# Patient Record
Sex: MALE | Race: WHITE | Employment: OTHER
[De-identification: names, ages, dates, MRNs, and addresses within clinical notes are randomized per-mention and may not be internally consistent; named-entity substitution may affect disease eponyms.]

---

## 2017-01-09 PROBLEM — M25.561 ACUTE PAIN OF RIGHT KNEE: Status: ACTIVE | Noted: 2017-01-09

## 2017-01-11 ENCOUNTER — PATIENT MESSAGE (OUTPATIENT)
Dept: FAMILY MEDICINE CLINIC | Facility: CLINIC | Age: 67
End: 2017-01-11

## 2017-01-12 NOTE — TELEPHONE ENCOUNTER
From: Meera Zhong  To: Monae Stephenson DO  Sent: 1/11/2017 10:58 AM EST  Subject: X Ray    Thanks. Please forgive my ignorance of your procedures, so I am asking this because I just don't know the answer. If you ordered the consult, then I don't need a copy of it? - I can just go ahead and book the appointment? Please advise. Thanks again  ----- Message -----  From: Monae Stephenson DO  Sent: 1/9/2017 8:56 PM EST  To: Meera Zhong  Subject: RE: Josué Wilson    Please note that I ordered the consult. Should wait until seeing ortho prior to trying to get the MRI, so as less likely to be denied . Thanks.     ----- Message -----   From: Meera Zhong   Sent: 1/8/2017 1:14 PM EST   To: Monae Stephenson DO  Subject: RE: Josué Wilson    Thank you! Yes, I'd like to follow through with this. I'll be out of town all week - could you have the front office mail the referral to me? If not, I'll stop and pick it up on the 16th, so if they could mail it, maybe I could make the appointment with Dr. Laney Santoyo for the 16th. Anyway, let me know please. .. Also, do you think an MRI would be of any value? Thanks again,    Melidaabimael Sheryl  [phone] cell  ----- Message -----  From: Monae Stephenson DO  Sent: 1/4/2017 6:37 PM EST  To: Meera Zhong  Subject: RE: Josué Wilson    We can refer to Dr Laney Santoyo of orthopedics [facility] us in this building. He is on the sport med team with us. Let me know. Thanks.     ----- Message -----   From: Meera Zhong   Sent: 1/4/2017 11:58 AM EST   To: Monae Stephenson DO  Subject: RE: [address] Dr,     I know you are very busy and so I do appreciate your reply. I continue the Celebrex, however I'd like to be more proactive. You mention PT - is there a PT or maybe a sports MD you'd want to refer me to? Would really like to determine the CAUSE. I really want to get active on this. [place] [facility] [place] later this year and I want to be able to run.      BTW the sensation (pain) seems to move around - maybe bc compensating somehow? Any additional help you could suggest would be welcome - thanks.  ----- Message -----  From: Justin Metcalf DO  Sent: 1/3/2017 10:31 PM EST  To: Tab Kaye  Subject: RE: Moni GONZALEZ still recommend to take the Celebrex for now if tolerated well. May need physical therapy. But take meds, and avoid activity that aggravates this. Thanks.     ----- Message -----   From: Tab Kaye   Sent: 12/30/2016 2:35 PM EST   To: Justin Metcalf DO  Subject: RE: Moni Wallace    Thanks for reply! Don't want to wait for appt to do something about the knee - your msg indicates that you saw no fracture, dislocation, or arthritis - correct? 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 Celebrex may help mask the symptom (too soon to be sure) but doesn't treat the cause - what else can I do to correct this? Thanks, happy new year!  ----- Message -----  From: Justin Metcalf DO  Sent: 12/29/2016 7:17 PM EST  To: Tab Kaye  Subject: RE: Moni GONZALEZ, please note that I wrote results notes on the results, and the x-ray of the knee is normal. Also not too concerned on the CRP. The thyroid is mildly low, and the cholesterol levels high. We can discuss this further at the next appt. Have a Happy New Year. Thanks.     ----- Message -----   From: Tab Kaye   Sent: 12/29/2016 10:37 AM EST   To: Justin Metcalf DO  Subject: Moni Moore,    hope you enjoyed your wife's birthday party last night. Thanks for taking some extra time for me - I appreciate it. Got the X ray done this morning, would appreciate any insights after you read it. I didn't stick around to get a disc copy, so if you want you could send it to me with your reply - thanks again.      I was interested that my C-reactive protein was up to 1.2 - I know that's not bad, but I've been used to seeing it under 0.5, in the past. My layperson understanding is that this reading can be generalized to symbolize all inflammation in the system, so I'm wondering if the increase is related to my knee and if, therefore, there really is more inflammation there than we suspected? Will get Celebrex today! Starla Calderon  ----- Message -----  From: Tay Pa DO  Sent: 11/21/2016 6:52 PM EST  To: Lele Granda  Subject: RE: Prescription Question    If you are early enough and we get the lab orders done, than most likely can have the labs done after appointment. But I need to see you to document the diagnoses etc to correlate why I am ordering the labs, and we review the records. Hopefully this would work out well. Thanks    ----- Message -----   From: Lele Granda   Sent: 11/21/2016 5:25 PM EST   To: Tay Pa DO  Subject: RE: Prescription Question    Thanks very much for getting back to me. So, to clarify, you DO. .. or DO NOT. .. want me to have labs drawn before I see you at 10 - ? Sorry, but your answer didn't make this clear to me. If you want them drawn before 10 - I assume I'll need an order from you before I walk in there. .. Please advise - thanks again.  ----- Message -----  From: Tay Pa DO  Sent: 11/7/2016 6:09 PM EST  To: Lele Granda  Subject: RE: Prescription Question    Hi, Yes we have records. Also if you have an appt before 11:00 AM, any day, or on Monday or Thursday, before 3:00 PM, should be able to have labs drawn here. Thanks.     ----- Message -----   From: Lele Granda   Sent: 11/5/2016 4:58 PM EDT   To: Tay Pa DO  Subject: RE: Prescription Question    Question - I've booked a \"wellness visit,\" per your recommendation, for Dec. 12, and have a couple of followup questions:    1. Has [facility] come through yet with my records? 2. I was told that you don't want blood work until AFTER I see you. If that is so, can I count on being able to get the blood work done that same day (after our appointment)? If yes, I will fast (our appointment is at [address] [place] [address]), if not I won't.     Please advise -

## 2017-03-13 ENCOUNTER — OFFICE VISIT (OUTPATIENT)
Dept: FAMILY MEDICINE CLINIC | Age: 67
End: 2017-03-13
Payer: MEDICARE

## 2017-03-13 VITALS
DIASTOLIC BLOOD PRESSURE: 71 MMHG | WEIGHT: 198 LBS | RESPIRATION RATE: 14 BRPM | SYSTOLIC BLOOD PRESSURE: 119 MMHG | TEMPERATURE: 96.7 F | BODY MASS INDEX: 24.62 KG/M2 | HEART RATE: 62 BPM | HEIGHT: 75 IN | OXYGEN SATURATION: 95 %

## 2017-03-13 DIAGNOSIS — H10.33 ACUTE CONJUNCTIVITIS OF BOTH EYES, UNSPECIFIED ACUTE CONJUNCTIVITIS TYPE: Primary | ICD-10-CM

## 2017-03-13 PROCEDURE — 99213 OFFICE O/P EST LOW 20 MIN: CPT | Performed by: FAMILY MEDICINE

## 2017-03-13 RX ORDER — TOBRAMYCIN 3 MG/ML
1 SOLUTION/ DROPS OPHTHALMIC EVERY 4 HOURS
Qty: 5 ML | Refills: 0 | Status: SHIPPED | OUTPATIENT
Start: 2017-03-13 | End: 2017-04-17 | Stop reason: ALTCHOICE

## 2017-03-13 RX ORDER — AZITHROMYCIN 250 MG/1
TABLET, FILM COATED ORAL
Qty: 6 TABLET | Refills: 0 | Status: SHIPPED | OUTPATIENT
Start: 2017-03-13 | End: 2017-04-17 | Stop reason: ALTCHOICE

## 2017-03-13 ASSESSMENT — ENCOUNTER SYMPTOMS
EYE PAIN: 1
NAUSEA: 0
EYE DISCHARGE: 1
SHORTNESS OF BREATH: 0
RHINORRHEA: 0
COUGH: 0
SORE THROAT: 0
BLOOD IN STOOL: 0
WHEEZING: 0
CONSTIPATION: 0
DIARRHEA: 0
VOMITING: 0
EYE REDNESS: 1
ABDOMINAL PAIN: 0
EYE ITCHING: 1

## 2017-04-17 ENCOUNTER — OFFICE VISIT (OUTPATIENT)
Dept: PULMONOLOGY | Age: 67
End: 2017-04-17
Payer: MEDICARE

## 2017-04-17 VITALS
TEMPERATURE: 98 F | BODY MASS INDEX: 25.03 KG/M2 | RESPIRATION RATE: 16 BRPM | WEIGHT: 195 LBS | HEIGHT: 74 IN | HEART RATE: 52 BPM | OXYGEN SATURATION: 99 % | SYSTOLIC BLOOD PRESSURE: 140 MMHG | DIASTOLIC BLOOD PRESSURE: 85 MMHG

## 2017-04-17 DIAGNOSIS — J45.30 MILD PERSISTENT ASTHMA WITHOUT COMPLICATION: Primary | ICD-10-CM

## 2017-04-17 PROCEDURE — 99213 OFFICE O/P EST LOW 20 MIN: CPT | Performed by: INTERNAL MEDICINE

## 2017-04-17 RX ORDER — CLOBETASOL PROPIONATE 0.46 MG/ML
SOLUTION TOPICAL
Refills: 0 | COMMUNITY
Start: 2017-03-21 | End: 2017-04-17 | Stop reason: ALTCHOICE

## 2017-04-17 RX ORDER — ALBUTEROL SULFATE 90 UG/1
2 AEROSOL, METERED RESPIRATORY (INHALATION) EVERY 6 HOURS PRN
Qty: 1 INHALER | Refills: 11 | Status: SHIPPED | OUTPATIENT
Start: 2017-04-17 | End: 2018-04-16 | Stop reason: SDUPTHER

## 2017-04-17 RX ORDER — MONTELUKAST SODIUM 10 MG/1
10 TABLET ORAL DAILY
Qty: 30 TABLET | Refills: 11 | Status: SHIPPED | OUTPATIENT
Start: 2017-04-17 | End: 2018-04-16 | Stop reason: SDUPTHER

## 2017-04-17 RX ORDER — KETOCONAZOLE 20 MG/ML
SHAMPOO TOPICAL
Refills: 0 | COMMUNITY
Start: 2017-03-21 | End: 2017-04-17 | Stop reason: ALTCHOICE

## 2017-04-17 ASSESSMENT — ENCOUNTER SYMPTOMS
WHEEZING: 1
EYES NEGATIVE: 1
SHORTNESS OF BREATH: 1

## 2017-05-08 ENCOUNTER — OFFICE VISIT (OUTPATIENT)
Dept: FAMILY MEDICINE CLINIC | Age: 67
End: 2017-05-08
Payer: MEDICARE

## 2017-05-08 VITALS
TEMPERATURE: 97 F | HEART RATE: 56 BPM | DIASTOLIC BLOOD PRESSURE: 57 MMHG | OXYGEN SATURATION: 95 % | WEIGHT: 195 LBS | HEIGHT: 74 IN | BODY MASS INDEX: 25.03 KG/M2 | SYSTOLIC BLOOD PRESSURE: 95 MMHG | RESPIRATION RATE: 14 BRPM

## 2017-05-08 DIAGNOSIS — E78.00 HYPERCHOLESTEREMIA: ICD-10-CM

## 2017-05-08 DIAGNOSIS — R19.5 POSITIVE FIT (FECAL IMMUNOCHEMICAL TEST): ICD-10-CM

## 2017-05-08 DIAGNOSIS — E03.9 HYPOTHYROIDISM (ACQUIRED): ICD-10-CM

## 2017-05-08 DIAGNOSIS — Z00.00 VISIT FOR WELL MAN HEALTH CHECK: Primary | ICD-10-CM

## 2017-05-08 DIAGNOSIS — Z00.00 ROUTINE GENERAL MEDICAL EXAMINATION AT A HEALTH CARE FACILITY: ICD-10-CM

## 2017-05-08 DIAGNOSIS — Z12.11 SCREENING FOR COLON CANCER: ICD-10-CM

## 2017-05-08 PROCEDURE — G0402 INITIAL PREVENTIVE EXAM: HCPCS | Performed by: FAMILY MEDICINE

## 2017-05-08 RX ORDER — LEVOTHYROXINE SODIUM 0.05 MG/1
50 TABLET ORAL DAILY
Qty: 30 TABLET | Refills: 1 | Status: SHIPPED | OUTPATIENT
Start: 2017-05-08 | End: 2017-07-03 | Stop reason: SDUPTHER

## 2017-05-08 ASSESSMENT — LIFESTYLE VARIABLES
HOW OFTEN DURING THE LAST YEAR HAVE YOU FAILED TO DO WHAT WAS NORMALLY EXPECTED FROM YOU BECAUSE OF DRINKING: 0
HOW MANY STANDARD DRINKS CONTAINING ALCOHOL DO YOU HAVE ON A TYPICAL DAY: 0
HOW OFTEN DURING THE LAST YEAR HAVE YOU HAD A FEELING OF GUILT OR REMORSE AFTER DRINKING: 0
HAS A RELATIVE, FRIEND, DOCTOR, OR ANOTHER HEALTH PROFESSIONAL EXPRESSED CONCERN ABOUT YOUR DRINKING OR SUGGESTED YOU CUT DOWN: 0
HOW OFTEN DO YOU HAVE A DRINK CONTAINING ALCOHOL: 2
HOW OFTEN DURING THE LAST YEAR HAVE YOU FOUND THAT YOU WERE NOT ABLE TO STOP DRINKING ONCE YOU HAD STARTED: 0
HOW OFTEN DO YOU HAVE SIX OR MORE DRINKS ON ONE OCCASION: 0
HOW OFTEN DURING THE LAST YEAR HAVE YOU NEEDED AN ALCOHOLIC DRINK FIRST THING IN THE MORNING TO GET YOURSELF GOING AFTER A NIGHT OF HEAVY DRINKING: 0
AUDIT TOTAL SCORE: 2
HOW OFTEN DO YOU HAVE SIX OR MORE DRINKS ON ONE OCCASION: 0
HOW OFTEN DURING THE LAST YEAR HAVE YOU BEEN UNABLE TO REMEMBER WHAT HAPPENED THE NIGHT BEFORE BECAUSE YOU HAD BEEN DRINKING: 0
HAVE YOU OR SOMEONE ELSE BEEN INJURED AS A RESULT OF YOUR DRINKING: 0
AUDIT-C TOTAL SCORE: 2
HOW OFTEN DO YOU HAVE A DRINK CONTAINING ALCOHOL: 2
HOW OFTEN DURING THE LAST YEAR HAVE YOU BEEN UNABLE TO REMEMBER WHAT HAPPENED THE NIGHT BEFORE BECAUSE YOU HAD BEEN DRINKING: 0
HOW OFTEN DURING THE LAST YEAR HAVE YOU FOUND THAT YOU WERE NOT ABLE TO STOP DRINKING ONCE YOU HAD STARTED: 0
AUDIT-C TOTAL SCORE: 2
HOW OFTEN DURING THE LAST YEAR HAVE YOU HAD A FEELING OF GUILT OR REMORSE AFTER DRINKING: 0
HOW MANY STANDARD DRINKS CONTAINING ALCOHOL DO YOU HAVE ON A TYPICAL DAY: 0

## 2017-05-08 ASSESSMENT — PATIENT HEALTH QUESTIONNAIRE - PHQ9: SUM OF ALL RESPONSES TO PHQ QUESTIONS 1-9: 0

## 2017-05-20 ENCOUNTER — PATIENT MESSAGE (OUTPATIENT)
Dept: PULMONOLOGY | Age: 67
End: 2017-05-20

## 2017-05-22 RX ORDER — AZITHROMYCIN 250 MG/1
TABLET, FILM COATED ORAL
Qty: 1 PACKET | Refills: 0 | OUTPATIENT
Start: 2017-05-22 | End: 2018-01-05 | Stop reason: SDUPTHER

## 2017-05-22 RX ORDER — PREDNISONE 10 MG/1
TABLET ORAL
Qty: 30 TABLET | Refills: 0 | OUTPATIENT
Start: 2017-05-22 | End: 2017-10-02 | Stop reason: ALTCHOICE

## 2017-05-24 ENCOUNTER — TELEPHONE (OUTPATIENT)
Dept: PULMONOLOGY | Age: 67
End: 2017-05-24

## 2017-06-12 ENCOUNTER — OFFICE VISIT (OUTPATIENT)
Dept: PULMONOLOGY | Age: 67
End: 2017-06-12
Payer: MEDICARE

## 2017-06-12 VITALS
RESPIRATION RATE: 18 BRPM | HEART RATE: 86 BPM | WEIGHT: 193 LBS | HEIGHT: 75 IN | DIASTOLIC BLOOD PRESSURE: 74 MMHG | OXYGEN SATURATION: 97 % | SYSTOLIC BLOOD PRESSURE: 150 MMHG | BODY MASS INDEX: 24 KG/M2

## 2017-06-12 DIAGNOSIS — J30.89 PERENNIAL ALLERGIC RHINITIS, UNSPECIFIED ALLERGIC RHINITIS TRIGGER: Primary | ICD-10-CM

## 2017-06-12 DIAGNOSIS — J45.30 MILD PERSISTENT ASTHMA WITHOUT COMPLICATION: ICD-10-CM

## 2017-06-12 PROCEDURE — 99213 OFFICE O/P EST LOW 20 MIN: CPT | Performed by: INTERNAL MEDICINE

## 2017-06-12 RX ORDER — FLUTICASONE PROPIONATE 50 MCG
2 SPRAY, SUSPENSION (ML) NASAL DAILY
Qty: 1 BOTTLE | Refills: 11 | Status: SHIPPED | OUTPATIENT
Start: 2017-06-12 | End: 2018-04-16 | Stop reason: SDUPTHER

## 2017-06-12 ASSESSMENT — ENCOUNTER SYMPTOMS
WHEEZING: 1
RHINORRHEA: 1
EYES NEGATIVE: 1
SINUS PRESSURE: 1
COUGH: 1
SHORTNESS OF BREATH: 1

## 2017-07-03 DIAGNOSIS — E03.9 HYPOTHYROIDISM (ACQUIRED): ICD-10-CM

## 2017-07-03 RX ORDER — LEVOTHYROXINE SODIUM 0.05 MG/1
50 TABLET ORAL DAILY
Qty: 30 TABLET | Refills: 1 | Status: SHIPPED | OUTPATIENT
Start: 2017-07-03 | End: 2017-08-28 | Stop reason: SDUPTHER

## 2017-07-17 ENCOUNTER — HOSPITAL ENCOUNTER (OUTPATIENT)
Age: 67
Setting detail: SPECIMEN
Discharge: HOME | End: 2017-07-17
Payer: MEDICARE

## 2017-07-17 DIAGNOSIS — E78.00 HYPERCHOLESTEREMIA: ICD-10-CM

## 2017-07-17 DIAGNOSIS — E03.9 HYPOTHYROIDISM (ACQUIRED): ICD-10-CM

## 2017-07-17 LAB
CHOLESTEROL/HDL RATIO: 4.8
CHOLESTEROL: 197 MG/DL
HDLC SERPL-MCNC: 41 MG/DL
LDL CHOLESTEROL: 141 MG/DL (ref 0–130)
TRIGL SERPL-MCNC: 73 MG/DL
TSH SERPL DL<=0.05 MIU/L-ACNC: 3.22 MIU/L (ref 0.3–5)
VLDLC SERPL CALC-MCNC: ABNORMAL MG/DL (ref 1–30)

## 2017-07-24 ENCOUNTER — OFFICE VISIT (OUTPATIENT)
Dept: FAMILY MEDICINE CLINIC | Age: 67
End: 2017-07-24
Payer: MEDICARE

## 2017-07-24 VITALS
TEMPERATURE: 97 F | DIASTOLIC BLOOD PRESSURE: 61 MMHG | HEIGHT: 75 IN | SYSTOLIC BLOOD PRESSURE: 99 MMHG | BODY MASS INDEX: 24.12 KG/M2 | RESPIRATION RATE: 16 BRPM | HEART RATE: 56 BPM | WEIGHT: 194 LBS

## 2017-07-24 DIAGNOSIS — E03.9 ACQUIRED HYPOTHYROIDISM: ICD-10-CM

## 2017-07-24 DIAGNOSIS — J45.20 MILD INTERMITTENT ASTHMA WITHOUT COMPLICATION: ICD-10-CM

## 2017-07-24 DIAGNOSIS — J30.89 PERENNIAL ALLERGIC RHINITIS, UNSPECIFIED ALLERGIC RHINITIS TRIGGER: ICD-10-CM

## 2017-07-24 DIAGNOSIS — E78.00 HYPERCHOLESTEREMIA: Primary | ICD-10-CM

## 2017-07-24 PROCEDURE — 99214 OFFICE O/P EST MOD 30 MIN: CPT | Performed by: FAMILY MEDICINE

## 2017-07-24 ASSESSMENT — ENCOUNTER SYMPTOMS
CONSTIPATION: 0
BLOOD IN STOOL: 0
NAUSEA: 0
COUGH: 1
VOMITING: 0
SHORTNESS OF BREATH: 0
ABDOMINAL PAIN: 0
SORE THROAT: 0
WHEEZING: 0
DIARRHEA: 0
RHINORRHEA: 1

## 2017-08-18 ENCOUNTER — TELEPHONE (OUTPATIENT)
Dept: FAMILY MEDICINE CLINIC | Age: 67
End: 2017-08-18

## 2017-08-18 DIAGNOSIS — J06.9 ACUTE URI: Primary | ICD-10-CM

## 2017-08-18 DIAGNOSIS — R05.9 COUGH: ICD-10-CM

## 2017-08-18 RX ORDER — BENZONATATE 100 MG/1
100 CAPSULE ORAL 3 TIMES DAILY PRN
Qty: 40 CAPSULE | Refills: 0 | Status: SHIPPED | OUTPATIENT
Start: 2017-08-18 | End: 2018-04-02 | Stop reason: ALTCHOICE

## 2017-08-18 RX ORDER — AZITHROMYCIN 250 MG/1
TABLET, FILM COATED ORAL
Qty: 6 TABLET | Refills: 0 | Status: SHIPPED | OUTPATIENT
Start: 2017-08-18 | End: 2017-10-02 | Stop reason: SDUPTHER

## 2017-10-02 ENCOUNTER — OFFICE VISIT (OUTPATIENT)
Dept: FAMILY MEDICINE CLINIC | Age: 67
End: 2017-10-02
Payer: MEDICARE

## 2017-10-02 VITALS
WEIGHT: 192.2 LBS | BODY MASS INDEX: 23.9 KG/M2 | SYSTOLIC BLOOD PRESSURE: 134 MMHG | OXYGEN SATURATION: 99 % | HEART RATE: 54 BPM | DIASTOLIC BLOOD PRESSURE: 79 MMHG | RESPIRATION RATE: 16 BRPM | HEIGHT: 75 IN

## 2017-10-02 DIAGNOSIS — H10.9 CONJUNCTIVITIS OF LEFT EYE, UNSPECIFIED CONJUNCTIVITIS TYPE: Primary | ICD-10-CM

## 2017-10-02 DIAGNOSIS — M62.830 MUSCLE SPASM OF BACK: ICD-10-CM

## 2017-10-02 PROCEDURE — 99213 OFFICE O/P EST LOW 20 MIN: CPT | Performed by: FAMILY MEDICINE

## 2017-10-02 RX ORDER — AZITHROMYCIN 250 MG/1
TABLET, FILM COATED ORAL
Qty: 6 TABLET | Refills: 0 | Status: SHIPPED | OUTPATIENT
Start: 2017-10-02 | End: 2018-01-05 | Stop reason: ALTCHOICE

## 2017-10-02 RX ORDER — BACLOFEN 10 MG/1
10 TABLET ORAL 3 TIMES DAILY PRN
Qty: 30 TABLET | Refills: 0 | Status: SHIPPED | OUTPATIENT
Start: 2017-10-02 | End: 2017-11-18 | Stop reason: SDUPTHER

## 2017-10-02 RX ORDER — TOBRAMYCIN 3 MG/ML
1 SOLUTION/ DROPS OPHTHALMIC EVERY 4 HOURS
Qty: 5 ML | Refills: 0 | Status: SHIPPED | OUTPATIENT
Start: 2017-10-02 | End: 2018-04-02 | Stop reason: ALTCHOICE

## 2017-10-02 ASSESSMENT — ENCOUNTER SYMPTOMS
DIARRHEA: 0
WHEEZING: 0
EYE REDNESS: 1
BACK PAIN: 1
NAUSEA: 0
COUGH: 0
ABDOMINAL PAIN: 0
RHINORRHEA: 0
VOMITING: 0
CONSTIPATION: 0
SHORTNESS OF BREATH: 0
SORE THROAT: 0
BLOOD IN STOOL: 0

## 2017-10-02 NOTE — PROGRESS NOTES
tablet 0    levothyroxine (SYNTHROID) 50 MCG tablet take 1 tablet by mouth once daily 30 tablet 5    fluticasone-salmeterol (ADVAIR DISKUS) 250-50 MCG/DOSE AEPB Inhale 1 puff into the lungs 2 times daily 60 each 11    montelukast (SINGULAIR) 10 MG tablet Take 1 tablet by mouth daily 30 tablet 11    albuterol sulfate HFA (PROAIR HFA) 108 (90 BASE) MCG/ACT inhaler Inhale 2 puffs into the lungs every 6 hours as needed for Wheezing 1 Inhaler 11    aspirin EC 81 MG EC tablet Take 81 mg by mouth every morning (before breakfast)      celecoxib (CELEBREX) 100 MG capsule Take 1-2 capsules by mouth daily as needed for Pain Take with food. 60 capsule 2    benzonatate (TESSALON PERLES) 100 MG capsule Take 1 capsule by mouth 3 times daily as needed for Cough 40 capsule 0    fluticasone (FLONASE) 50 MCG/ACT nasal spray 2 sprays by Nasal route daily 1 Bottle 11     No current facility-administered medications for this visit. Note review of PMH, PSH, PFH, social and immunizations. Past Medical History:   Diagnosis Date    Anxiety     remote, not ongoing    Asthma 1990    Deep vein thrombosis (DVT) of right lower extremity ([facility] [address] 1997    Depression     remote, not ongoing    Early stage skin cancer 2012    excised by dermatology then.  Factor V deficiency ([facility] [address]     Hx of factor V Leiden mutation 10/20/2016    Hypercholesteremia      Past Surgical History:   Procedure Laterality Date    VASECTOMY  1988     Family History   Problem Relation Age of Onset    Dementia Paternal Grandfather     Other Paternal Grandfather      ASDx     Social History     Social History    Marital status:      Spouse name: N/A    Number of children: N/A    Years of education: N/A     Occupational History    Not on file.      Social History Main Topics    Smoking status: Former Smoker     Packs/day: 2.00     Years: 23.00     Types: Cigarettes     Quit date: 3/30/1986    Smokeless tobacco: Never Used    Alcohol use oropharyngeal edema or posterior oropharyngeal erythema. Eyes: EOM are normal. Pupils are equal, round, and reactive to light. Right eye exhibits no discharge. Left eye exhibits discharge. Right conjunctiva is not injected. Left conjunctiva is injected. No scleral icterus. Left eye with conjunctival swelling. Note cornea and anterior chamber grossly clear. Neck: No JVD present. No thyromegaly present. Cardiovascular: Normal rate, regular rhythm, normal heart sounds and intact distal pulses. No murmur heard. Pulmonary/Chest: Effort normal and breath sounds normal. No respiratory distress. He has no wheezes. He has no rales. Abdominal: Soft. Bowel sounds are normal. He exhibits no distension and no mass. There is no tenderness. Musculoskeletal: He exhibits no edema (neg Homans'). Note increased restriction and muscle spasm of the left upper back area. Lymphadenopathy:     He has no cervical adenopathy. Neurological: He is alert and oriented to person, place, and time. He exhibits normal muscle tone. Skin: No rash noted. Vitals reviewed. Assessment:     1. Conjunctivitis of left eye, unspecified conjunctivitis type  tobramycin (TOBREX) 0.3 % ophthalmic solution    azithromycin (ZITHROMAX Z-INDU) 250 MG tablet   2. Muscle spasm of back  baclofen (LIORESAL) 10 MG tablet       Plan:     Case thoroughly discussed with patient and proposed management and DDg. Patient Instructions   Continue Diet low salt, high fiber, avoiding concentrated sweets. Continue fluids (water based) regularly especially in hot weather. Continue activity and exercise as tolerated. Continue present medications as ordered. Note the refill of the Tobrex eye drops, and the zithromax. Also the baclofen to help with muscle spasm. Note if upper back, neck etc not improving need to consider follow up appt with US/ OMT. Also if eye not improving, will need to have follow up with the eye doctor. lid.  ¨ Close your eye for 30 to 60 seconds to let the drops or ointment move around. ¨ Do not touch the ointment or dropper tip to your eyelashes or any other surface. · Do not share towels, pillows, or washcloths while you have pinkeye. When should you call for help? Call your doctor now or seek immediate medical care if:  · You have pain in your eye, not just irritation on the surface. · You have a change in vision or loss of vision. · You have an increase in discharge from the eye. · Your eye has not started to improve or begins to get worse within 48 hours after you start using antibiotics. · Pinkeye lasts longer than 7 days. Watch closely for changes in your health, and be sure to contact your doctor if you have any problems. Where can you learn more? Go to [email or web address] and sign in to your [employer] account. Enter Y392 in the [employer] box to learn more about \"Pinkeye: Care Instructions. \"     If you do not have an account, please click on the \"Sign Up Now\" link. Current as of: March 20, 2017  Content Version: 11.3  © 3653-6235 [employer]. Care instructions adapted under license by [facility] ([facility]). If you have questions about a medical condition or this instruction, always ask your healthcare professional. [place] [address] any warranty or liability for your use of this information. Back Spasm: Care Instructions  Your Care Instructions  A back spasm is sudden tightness and pain in your back muscles. It may happen from overuse or an injury. Things like sleeping in an awkward way, bending, lifting, standing, or sitting can sometimes cause a spasm. But the cause isn't always clear. Home treatment includes using heat or ice, taking over-the-counter (OTC) pain medicines, and avoiding activities that may cause back pain. For a back spasm that doesn't get better with home care, your doctor may prescribe medicine.  Treatments such as massage or manipulation may also help ease a back spasm. Your doctor may also suggest exercise or physical therapy to help improve strength and flexibility in your back muscles. In most cases, getting back to your normal activities is good for your back. Just make sure to avoid doing things that make your pain worse. Follow-up care is a key part of your treatment and safety. Be sure to make and go to all appointments, and call your doctor if you are having problems. It's also a good idea to know your test results and keep a list of the medicines you take. How can you care for yourself at home? Heat, ice, and medicines  · To relieve pain, use heat or ice (whichever feels better) on the affected area. ¨ Put a warm water bottle, a heating pad set on low, or a warm cloth on your back. Put a thin cloth between the heating pad and your skin. Do not go to sleep with a heating pad on your skin. ¨ Try ice or a cold pack on the area for 10 to 20 minutes at a time. Put a thin cloth between the ice and your skin. · Ask your doctor if you can take acetaminophen (such as Tylenol) or nonsteroidal anti-inflammatory drugs, such as ibuprofen or naproxen. Your doctor can prescribe stronger medicines if needed. Be safe with medicines. Read and follow all instructions on the label. Body positions and posture  · Sit or lie in positions that are most comfortable for you and that reduce pain. Try one of these positions when you lie down:  ¨ Lie on your back with your knees bent and supported by large pillows. ¨ Lie on the floor with your legs on the seat of a sofa or chair. Karolina Corby on your side with your knees and hips bent and a pillow between your legs. ¨ Lie on your stomach if it does not make pain worse. · Do not sit up in bed. Avoid soft couches and twisted positions. · Avoid bed rest after the first day of back pain. Bed rest can help relieve pain at first, but it delays healing.  Continued rest without activity is information. baclofen  Pronunciation:  AUTUMN de santiago  Brand:  Lioresal  What is the most important information I should know about baclofen? Do not use baclofen at a time when you need muscle tone for safe balance and movement during certain activities. Do not stop using baclofen suddenly, or you could have unpleasant withdrawal symptoms. What is baclofen? Baclofen is a muscle relaxer and an antispastic agent. Baclofen is used to treat muscle symptoms caused by multiple sclerosis, including spasm, pain, and stiffness. Baclofen is sometimes used to treat muscle spasms and other symptoms in people with injury or disease of the spinal cord. Baclofen may also be used for purposes not listed in this medication guide. What should I discuss with my healthcare provider before taking baclofen? You should not use baclofen if you are allergic to it. To make sure baclofen is safe for you, tell your doctor if you have:  · kidney disease;  · epilepsy or other seizure disorder;  · a history of stroke or blood clots; or  · if you also use a narcotic (opioid) medication. Using baclofen during pregnancy could harm the unborn baby. In animal studies, baclofen caused low birth weight and birth defects. However, it is not known whether these effects would occur in humans. Tell your doctor if you are pregnant or if you become pregnant while using this medicine. It is not known whether baclofen passes into breast milk or if it could harm a nursing baby. You should not breast-feed while you are using baclofen. Using baclofen may increase your risk of developing an ovarian cyst. Talk with your doctor about your specific risk. Baclofen is not approved for use by anyone younger than 15years old. How should I take baclofen? Follow all directions on your prescription label. Your doctor may occasionally change your dose to make sure you get the best results.  Do not use this medicine in larger or smaller amounts or for longer than recommended. Call your doctor if your muscle symptoms do not improve, or if they get worse. Do not stop using baclofen suddenly, or you could have unpleasant withdrawal symptoms such as hallucinations or a seizure. Ask your doctor how to safely stop using this medicine. Store at room temperature away from moisture and heat. What happens if I miss a dose? Take the missed dose as soon as you remember. Skip the missed dose if it is almost time for your next scheduled dose. Do not take extra medicine to make up the missed dose. What happens if I overdose? Seek emergency medical attention or call the Poison Help line at 1-460.861.7834. Overdose symptoms may include muscle weakness, vomiting, drowsiness, dilated or pinpoint pupils, weak or shallow breathing, seizure, or coma. What should I avoid while taking baclofen? Do not use baclofen at a time when you need muscle tone for safe balance and movement during certain activities. In some situations, it may be dangerous for you to have reduced muscle tone. Drinking alcohol with this medicine can cause side effects. This medication may impair your thinking or reactions. Be careful if you drive or do anything that requires you to be alert. What are the possible side effects of baclofen? Get emergency medical help if you have signs of an allergic reaction: hives; difficult breathing; swelling of your face, lips, tongue, or throat. Call your doctor at once if you have:  · weak or shallow breathing;  · confusion, hallucinations; or  · a seizure (convulsions). Common side effects may include:  · drowsiness, dizziness, weakness, tired feeling;  · headache;  · sleep problems (insomnia);  · nausea, constipation; or  · urinating more often than usual.  This is not a complete list of side effects and others may occur. Call your doctor for medical advice about side effects. You may report side effects to FDA at 7-087-YGE-1571.   What other drugs will affect

## 2017-10-02 NOTE — MR AVS SNAPSHOT
with US/ OMT. Also if eye not improving, will need to have follow up with the eye doctor. Pinkeye: Care Instructions  Your Care Instructions    Pinkeye is redness and swelling of the eye surface and the conjunctiva (the lining of the eyelid and the covering of the white part of the eye). Pinkeye is also called conjunctivitis. Pinkeye is often caused by infection with bacteria or a virus. Dry air, allergies, smoke, and chemicals are other common causes. Pinkeye often clears on its own in 7 to 10 days. Antibiotics only help if the pinkeye is caused by bacteria. Pinkeye caused by infection spreads easily. If an allergy or chemical is causing pinkeye, it will not go away unless you can avoid whatever is causing it. Follow-up care is a key part of your treatment and safety. Be sure to make and go to all appointments, and call your doctor if you are having problems. It's also a good idea to know your test results and keep a list of the medicines you take. How can you care for yourself at home? · Wash your hands often. Always wash them before and after you treat pinkeye or touch your eyes or face. · Use moist cotton or a clean, wet cloth to remove crust. Wipe from the inside corner of the eye to the outside. Use a clean part of the cloth for each wipe. · Put cold or warm wet cloths on your eye a few times a day if the eye hurts. · Do not wear contact lenses or eye makeup until the pinkeye is gone. Throw away any eye makeup you were using when you got pinkeye. Clean your contacts and storage case. If you wear disposable contacts, use a new pair when your eye has cleared and it is safe to wear contacts again. · If the doctor gave you antibiotic ointment or eyedrops, use them as directed. Use the medicine for as long as instructed, even if your eye starts looking better soon. Keep the bottle tip clean, and do not let it touch the eye area.   · To put in eyedrops or ointment: ¨ Tilt your head back, and pull your lower eyelid down with one finger. ¨ Drop or squirt the medicine inside the lower lid. ¨ Close your eye for 30 to 60 seconds to let the drops or ointment move around. ¨ Do not touch the ointment or dropper tip to your eyelashes or any other surface. · Do not share towels, pillows, or washcloths while you have pinkeye. When should you call for help? Call your doctor now or seek immediate medical care if:  · You have pain in your eye, not just irritation on the surface. · You have a change in vision or loss of vision. · You have an increase in discharge from the eye. · Your eye has not started to improve or begins to get worse within 48 hours after you start using antibiotics. · Pinkeye lasts longer than 7 days. Watch closely for changes in your health, and be sure to contact your doctor if you have any problems. Where can you learn more? Go to [email or web address] org and sign in to your TagArray account. Enter Y392 in the Rocketboom box to learn more about \"Pinkeye: Care Instructions. \"     If you do not have an account, please click on the \"Sign Up Now\" link. Current as of: March 20, 2017  Content Version: 11.3  © 0674-0954 [employer]. Care instructions adapted under license by [facility] ([facility]). If you have questions about a medical condition or this instruction, always ask your healthcare professional. [place] [address] any warranty or liability for your use of this information. Back Spasm: Care Instructions  Your Care Instructions  A back spasm is sudden tightness and pain in your back muscles. It may happen from overuse or an injury. Things like sleeping in an awkward way, bending, lifting, standing, or sitting can sometimes cause a spasm. But the cause isn't always clear.   Home treatment includes using heat or ice, taking over-the-counter (OTC) license by [facility] ([facility]). If you have questions about a medical condition or this instruction, always ask your healthcare professional. [address] any warranty or liability for your use of this information. baclofen  Pronunciation:  AUTUMN de santiago  Brand:  Lioresal  What is the most important information I should know about baclofen? Do not use baclofen at a time when you need muscle tone for safe balance and movement during certain activities. Do not stop using baclofen suddenly, or you could have unpleasant withdrawal symptoms. What is baclofen? Baclofen is a muscle relaxer and an antispastic agent. Baclofen is used to treat muscle symptoms caused by multiple sclerosis, including spasm, pain, and stiffness. Baclofen is sometimes used to treat muscle spasms and other symptoms in people with injury or disease of the spinal cord. Baclofen may also be used for purposes not listed in this medication guide. What should I discuss with my healthcare provider before taking baclofen? You should not use baclofen if you are allergic to it. To make sure baclofen is safe for you, tell your doctor if you have:  · kidney disease;  · epilepsy or other seizure disorder;  · a history of stroke or blood clots; or  · if you also use a narcotic (opioid) medication. Using baclofen during pregnancy could harm the unborn baby. In animal studies, baclofen caused low birth weight and birth defects. However, it is not known whether these effects would occur in humans. Tell your doctor if you are pregnant or if you become pregnant while using this medicine. It is not known whether baclofen passes into breast milk or if it could harm a nursing baby. You should not breast-feed while you are using baclofen. Using baclofen may increase your risk of developing an ovarian cyst. Talk with your doctor about your specific risk. Baclofen is not approved for use by anyone younger than 15years old. How should I take baclofen? Follow all directions on your prescription label. Your doctor may occasionally change your dose to make sure you get the best results. Do not use this medicine in larger or smaller amounts or for longer than recommended. Call your doctor if your muscle symptoms do not improve, or if they get worse. Do not stop using baclofen suddenly, or you could have unpleasant withdrawal symptoms such as hallucinations or a seizure. Ask your doctor how to safely stop using this medicine. Store at room temperature away from moisture and heat. What happens if I miss a dose? Take the missed dose as soon as you remember. Skip the missed dose if it is almost time for your next scheduled dose. Do not take extra medicine to make up the missed dose. What happens if I overdose? Seek emergency medical attention or call the Poison Help line at 1-909.327.4769. Overdose symptoms may include muscle weakness, vomiting, drowsiness, dilated or pinpoint pupils, weak or shallow breathing, seizure, or coma. What should I avoid while taking baclofen? Do not use baclofen at a time when you need muscle tone for safe balance and movement during certain activities. In some situations, it may be dangerous for you to have reduced muscle tone. Drinking alcohol with this medicine can cause side effects. This medication may impair your thinking or reactions. Be careful if you drive or do anything that requires you to be alert. What are the possible side effects of baclofen? Get emergency medical help if you have signs of an allergic reaction: hives; difficult breathing; swelling of your face, lips, tongue, or throat. Call your doctor at once if you have:  · weak or shallow breathing;  · confusion, hallucinations; or  · a seizure (convulsions).   Common side effects may include:  · drowsiness, dizziness, weakness, tired feeling;  · headache;  · sleep problems (insomnia);  · nausea, constipation; or healthcare practitioners. The absence of a warning for a given drug or drug combination in no way should be construed to indicate that the drug or drug combination is safe, effective or appropriate for any given patient. [facility] does not assume any responsibility for any aspect of healthcare administered with the aid of information [facility] provides. The information contained herein is not intended to cover all possible uses, directions, precautions, warnings, drug interactions, allergic reactions, or adverse effects. If you have questions about the drugs you are taking, check with your doctor, nurse or pharmacist.  Copyright 5642-5471 [address]. Version: 6.01. Revision date: 12/2/2016. Care instructions adapted under license by [facility] ([facility]). If you have questions about a medical condition or this instruction, always ask your healthcare professional. [place] [address] any warranty or liability for your use of this information. Today's Medication Changes          These changes are accurate as of: 10/2/17  6:14 PM.  If you have any questions, ask your nurse or doctor. START taking these medications           azithromycin 250 MG tablet   Commonly known as:  ZITHROMAX Z-INDU   Instructions: Take two tablets the first day, then one daily until gone. Quantity:  6 tablet   Refills:  0   Started by:  Mary Fisher DO       baclofen 10 MG tablet   Commonly known as:  LIORESAL   Instructions: Take 1 tablet by mouth 3 times daily as needed (muscle spasm) Sedation warning.    Quantity:  30 tablet   Refills:  0   Started by:  Mary Fisher DO       tobramycin 0.3 % ophthalmic solution   Commonly known as:  TOBREX   Instructions:  Place 1 drop into both eyes every 4 hours   Quantity:  5 mL   Refills:  0   Started by:  Mary Fisher, DO         STOP taking these medications           predniSONE 10 MG tablet   Commonly known as:  Sourav Theodore

## 2017-10-02 NOTE — PATIENT INSTRUCTIONS
Continue Diet low salt, high fiber, avoiding concentrated sweets. Continue fluids (water based) regularly especially in hot weather. Continue activity and exercise as tolerated. Continue present medications as ordered. Note the refill of the Tobrex eye drops, and the zithromax. Also the baclofen to help with muscle spasm. Note if upper back, neck etc not improving need to consider follow up appt with US/ OMT. Also if eye not improving, will need to have follow up with the eye doctor. Pinkeye: Care Instructions  Your Care Instructions    Pinkeye is redness and swelling of the eye surface and the conjunctiva (the lining of the eyelid and the covering of the white part of the eye). Pinkeye is also called conjunctivitis. Pinkeye is often caused by infection with bacteria or a virus. Dry air, allergies, smoke, and chemicals are other common causes. Pinkeye often clears on its own in 7 to 10 days. Antibiotics only help if the pinkeye is caused by bacteria. Pinkeye caused by infection spreads easily. If an allergy or chemical is causing pinkeye, it will not go away unless you can avoid whatever is causing it. Follow-up care is a key part of your treatment and safety. Be sure to make and go to all appointments, and call your doctor if you are having problems. It's also a good idea to know your test results and keep a list of the medicines you take. How can you care for yourself at home? · Wash your hands often. Always wash them before and after you treat pinkeye or touch your eyes or face. · Use moist cotton or a clean, wet cloth to remove crust. Wipe from the inside corner of the eye to the outside. Use a clean part of the cloth for each wipe. · Put cold or warm wet cloths on your eye a few times a day if the eye hurts. · Do not wear contact lenses or eye makeup until the pinkeye is gone. Throw away any eye makeup you were using when you got pinkeye. Clean your contacts and storage case.  If you wear disposable contacts, use a new pair when your eye has cleared and it is safe to wear contacts again. · If the doctor gave you antibiotic ointment or eyedrops, use them as directed. Use the medicine for as long as instructed, even if your eye starts looking better soon. Keep the bottle tip clean, and do not let it touch the eye area. · To put in eyedrops or ointment:  ¨ Tilt your head back, and pull your lower eyelid down with one finger. ¨ Drop or squirt the medicine inside the lower lid. ¨ Close your eye for 30 to 60 seconds to let the drops or ointment move around. ¨ Do not touch the ointment or dropper tip to your eyelashes or any other surface. · Do not share towels, pillows, or washcloths while you have pinkeye. When should you call for help? Call your doctor now or seek immediate medical care if:  · You have pain in your eye, not just irritation on the surface. · You have a change in vision or loss of vision. · You have an increase in discharge from the eye. · Your eye has not started to improve or begins to get worse within 48 hours after you start using antibiotics. · Pinkeye lasts longer than 7 days. Watch closely for changes in your health, and be sure to contact your doctor if you have any problems. Where can you learn more? Go to [email or web address]://[email or web address].[email or web address] and sign in to your NetShoes account. Enter Y392 in the mParticle box to learn more about \"Pinkeye: Care Instructions. \"     If you do not have an account, please click on the \"Sign Up Now\" link. Current as of: March 20, 2017  Content Version: 11.3  © 6182-3673 [employer]. Care instructions adapted under license by [facility] ([facility]). If you have questions about a medical condition or this instruction, always ask your healthcare professional. [address] any warranty or liability for your use of this information.        Back Spasm: Care Instructions  Your Care the Search Health Information box to learn more about \"Back Spasm: Care Instructions. \"     If you do not have an account, please click on the \"Sign Up Now\" link. Current as of: March 21, 2017  Content Version: 11.3  © 9812-3756 [employer]. Care instructions adapted under license by [facility] ([facility]). If you have questions about a medical condition or this instruction, always ask your healthcare professional. [address] any warranty or liability for your use of this information. baclofen  Pronunciation:  AUTUMN de santiago  Brand:  Lioresal  What is the most important information I should know about baclofen? Do not use baclofen at a time when you need muscle tone for safe balance and movement during certain activities. Do not stop using baclofen suddenly, or you could have unpleasant withdrawal symptoms. What is baclofen? Baclofen is a muscle relaxer and an antispastic agent. Baclofen is used to treat muscle symptoms caused by multiple sclerosis, including spasm, pain, and stiffness. Baclofen is sometimes used to treat muscle spasms and other symptoms in people with injury or disease of the spinal cord. Baclofen may also be used for purposes not listed in this medication guide. What should I discuss with my healthcare provider before taking baclofen? You should not use baclofen if you are allergic to it. To make sure baclofen is safe for you, tell your doctor if you have:  · kidney disease;  · epilepsy or other seizure disorder;  · a history of stroke or blood clots; or  · if you also use a narcotic (opioid) medication. Using baclofen during pregnancy could harm the unborn baby. In animal studies, baclofen caused low birth weight and birth defects. However, it is not known whether these effects would occur in humans. Tell your doctor if you are pregnant or if you become pregnant while using this medicine.   It is not known whether baclofen passes into breast milk or if it you have:  · weak or shallow breathing;  · confusion, hallucinations; or  · a seizure (convulsions). Common side effects may include:  · drowsiness, dizziness, weakness, tired feeling;  · headache;  · sleep problems (insomnia);  · nausea, constipation; or  · urinating more often than usual.  This is not a complete list of side effects and others may occur. Call your doctor for medical advice about side effects. You may report side effects to FDA at 3-283-YPW-2206. What other drugs will affect baclofen? Taking baclofen with other drugs that make you sleepy or slow your breathing can cause dangerous side effects or death. Ask your doctor before taking a sleeping pill, narcotic pain medicine, prescription cough medicine, a muscle relaxer, or medicine for anxiety, depression, or seizures. Other drugs may interact with baclofen, including prescription and over-the-counter medicines, vitamins, and herbal products. Tell each of your health care providers about all medicines you use now and any medicine you start or stop using. Where can I get more information? Your pharmacist can provide more information about baclofen. Remember, keep this and all other medicines out of the reach of children, never share your medicines with others, and use this medication only for the indication prescribed. Every effort has been made to ensure that the information provided by [address] is accurate, up-to-date, and complete, but no guarantee is made to that effect. Drug information contained herein may be time sensitive. [facility] information has been compiled for use by healthcare practitioners and consumers in the [place] and therefore [facility] does not warrant that uses outside of the [place] are appropriate, unless specifically indicated otherwise. [facility]'s drug information does not endorse drugs, diagnose patients or recommend therapy.  [employer]'s drug information is an informational resource designed

## 2018-01-02 DIAGNOSIS — B00.9 HSV-2 INFECTION: Primary | ICD-10-CM

## 2018-01-02 RX ORDER — VALACYCLOVIR HYDROCHLORIDE 500 MG/1
500 TABLET, FILM COATED ORAL 2 TIMES DAILY
Qty: 35 TABLET | Refills: 0 | Status: SHIPPED | OUTPATIENT
Start: 2018-01-02 | End: 2018-01-31 | Stop reason: SDUPTHER

## 2018-01-05 ENCOUNTER — TELEPHONE (OUTPATIENT)
Dept: FAMILY MEDICINE CLINIC | Age: 68
End: 2018-01-05

## 2018-01-05 DIAGNOSIS — J40 BRONCHITIS: Primary | ICD-10-CM

## 2018-01-05 RX ORDER — AZITHROMYCIN 250 MG/1
TABLET, FILM COATED ORAL
Qty: 1 PACKET | Refills: 0 | Status: SHIPPED | OUTPATIENT
Start: 2018-01-05 | End: 2018-01-15

## 2018-01-05 NOTE — TELEPHONE ENCOUNTER
Please call pt for more information on this, and we could probably sent this in. But also should have a follow up visit in 7-10 days for this and the other problems. Let me know. Thanks.

## 2018-01-05 NOTE — TELEPHONE ENCOUNTER
Saida Price stopped in to request a z-christi. He said he had sent you a email and had not heard from you. I did not see that in the chart. I did explain that you will probably require a appointment.

## 2018-01-05 NOTE — TELEPHONE ENCOUNTER
Fever LAST NIGHT COUGH SLIGHTLY PRODUCTIVE SAYS zPAK WORKS WELL FOR HIM PLEASE CALL IN TO [facility]  [place]

## 2018-01-30 ENCOUNTER — PATIENT MESSAGE (OUTPATIENT)
Dept: FAMILY MEDICINE CLINIC | Facility: CLINIC | Age: 68
End: 2018-01-30

## 2018-01-31 DIAGNOSIS — B00.9 HSV-2 INFECTION: ICD-10-CM

## 2018-01-31 RX ORDER — VALACYCLOVIR HYDROCHLORIDE 500 MG/1
500 TABLET, FILM COATED ORAL 2 TIMES DAILY
Qty: 35 TABLET | Refills: 1 | Status: SHIPPED | OUTPATIENT
Start: 2018-01-31 | End: 2018-06-12 | Stop reason: SDUPTHER

## 2018-01-31 NOTE — TELEPHONE ENCOUNTER
Ray    You can schedule the annual medicare well visit thru the . Also will need list of doctors on your insurance, or we can just try a different group. Thanks.     ----- Message -----   From: Eduardo Lowry Bao   Sent: 4/24/2017 3:58 PM EDT   To: Ngoc Osorio DO  Subject: RE: TED Rosales,    two things -- 1), you issued me a referral to Dr. Juana Walker to follow up on my stool sample - I went today to schedule an appointment and learned that they don't honor [facility] . Can you recommend some other practitioner who is with [facility]? 2) You stated that I am entitled to a [place] Visit\" (or something like that) each year, and you suggested we ought to schedule that in my birthday month, which is May. Should I schedule that through you or through the ? Let me know please. .. Subjects of discussion at that visit would be the GI stuff (I'd like to discuss with you more fully before I consult with the specialist, whoever that turns out to be) and the testosterone discussion. Thanks! Sofi Butler  [phone]  ----- Message -----  From: Ngoc Osorio DO  Sent: 4/3/2017 11:34 PM EDT  To: Eduardo Gore  Subject: RE: X Ray    Duplicate message. ----- Message -----   From: Rex Harrison   Sent: 3/31/2017 6:38 PM EDT   To: Ngoc Osorio DO  Subject: RE: Mily Rosales,    happy to say the derma visit went fine, condition seems under control. Will schedule GI consult soon. Eye condition responded to Zpack and drops also, thanks. Question: a friend sees Dr. Gita Bobby for something called \"depo-testosterone\" shots? You do these? Do you recommend? Pros and cons? My issues, in descending order, are:    1. overall energy  2. libido, as it related to energy and desire (erections not a problem)  3. muscle tone in general    Your input desired and welcome - thanks!     Sofi Butler  [phone] cell  ----- Message -----  From: Ngoc Osorio DO  Sent: 2/9/2017 6:32 PM EST  To:

## 2018-02-08 DIAGNOSIS — N52.9 ERECTILE DYSFUNCTION, UNSPECIFIED ERECTILE DYSFUNCTION TYPE: Primary | ICD-10-CM

## 2018-02-08 RX ORDER — SILDENAFIL 50 MG/1
50 TABLET, FILM COATED ORAL PRN
Qty: 10 TABLET | Refills: 0 | Status: SHIPPED | OUTPATIENT
Start: 2018-02-08 | End: 2020-07-24

## 2018-04-02 ENCOUNTER — OFFICE VISIT (OUTPATIENT)
Dept: FAMILY MEDICINE CLINIC | Age: 68
End: 2018-04-02
Payer: MEDICARE

## 2018-04-02 ENCOUNTER — HOSPITAL ENCOUNTER (OUTPATIENT)
Age: 68
Setting detail: SPECIMEN
Discharge: HOME | End: 2018-04-02
Payer: MEDICARE

## 2018-04-02 VITALS
TEMPERATURE: 97 F | SYSTOLIC BLOOD PRESSURE: 110 MMHG | HEIGHT: 75 IN | DIASTOLIC BLOOD PRESSURE: 68 MMHG | OXYGEN SATURATION: 99 % | BODY MASS INDEX: 22.75 KG/M2 | WEIGHT: 183 LBS | HEART RATE: 55 BPM | RESPIRATION RATE: 14 BRPM

## 2018-04-02 DIAGNOSIS — Z12.5 SCREENING FOR PROSTATE CANCER: ICD-10-CM

## 2018-04-02 DIAGNOSIS — E78.00 HYPERCHOLESTEREMIA: ICD-10-CM

## 2018-04-02 DIAGNOSIS — N52.9 ERECTILE DYSFUNCTION, UNSPECIFIED ERECTILE DYSFUNCTION TYPE: ICD-10-CM

## 2018-04-02 DIAGNOSIS — J45.20 MILD INTERMITTENT ASTHMA WITHOUT COMPLICATION: ICD-10-CM

## 2018-04-02 DIAGNOSIS — E78.00 HYPERCHOLESTEREMIA: Primary | ICD-10-CM

## 2018-04-02 DIAGNOSIS — E03.9 ACQUIRED HYPOTHYROIDISM: ICD-10-CM

## 2018-04-02 DIAGNOSIS — L57.0 SENILE KERATOSIS: ICD-10-CM

## 2018-04-02 DIAGNOSIS — Z23 NEED FOR PROPHYLACTIC VACCINATION AGAINST STREPTOCOCCUS PNEUMONIAE (PNEUMOCOCCUS): ICD-10-CM

## 2018-04-02 LAB
ABSOLUTE EOS #: 0.09 K/UL (ref 0–0.44)
ABSOLUTE IMMATURE GRANULOCYTE: <0.03 K/UL (ref 0–0.3)
ABSOLUTE LYMPH #: 1.2 K/UL (ref 1.1–3.7)
ABSOLUTE MONO #: 0.54 K/UL (ref 0.1–1.2)
ALBUMIN SERPL-MCNC: 4 G/DL (ref 3.5–5.2)
ALBUMIN/GLOBULIN RATIO: 1.4 (ref 1–2.5)
ALP BLD-CCNC: 30 U/L (ref 40–129)
ALT SERPL-CCNC: 15 U/L (ref 5–41)
ANION GAP SERPL CALCULATED.3IONS-SCNC: 14 MMOL/L (ref 9–17)
AST SERPL-CCNC: 17 U/L
BASOPHILS # BLD: 1 % (ref 0–2)
BASOPHILS ABSOLUTE: 0.04 K/UL (ref 0–0.2)
BILIRUB SERPL-MCNC: 0.28 MG/DL (ref 0.3–1.2)
BUN BLDV-MCNC: 17 MG/DL (ref 8–23)
BUN/CREAT BLD: ABNORMAL (ref 9–20)
CALCIUM SERPL-MCNC: 8.9 MG/DL (ref 8.6–10.4)
CHLORIDE BLD-SCNC: 105 MMOL/L (ref 98–107)
CHOLESTEROL/HDL RATIO: 3.4
CHOLESTEROL: 171 MG/DL
CO2: 24 MMOL/L (ref 20–31)
CREAT SERPL-MCNC: 1.11 MG/DL (ref 0.7–1.2)
DIFFERENTIAL TYPE: ABNORMAL
EOSINOPHILS RELATIVE PERCENT: 2 % (ref 1–4)
GFR AFRICAN AMERICAN: >60 ML/MIN
GFR NON-AFRICAN AMERICAN: >60 ML/MIN
GFR SERPL CREATININE-BSD FRML MDRD: ABNORMAL ML/MIN/{1.73_M2}
GFR SERPL CREATININE-BSD FRML MDRD: ABNORMAL ML/MIN/{1.73_M2}
GLUCOSE BLD-MCNC: 94 MG/DL (ref 70–99)
HCT VFR BLD CALC: 44.8 % (ref 40.7–50.3)
HDLC SERPL-MCNC: 50 MG/DL
HEMOGLOBIN: 14.5 G/DL (ref 13–17)
IMMATURE GRANULOCYTES: 0 %
LDL CHOLESTEROL: 110 MG/DL (ref 0–130)
LYMPHOCYTES # BLD: 23 % (ref 24–43)
MCH RBC QN AUTO: 31 PG (ref 25.2–33.5)
MCHC RBC AUTO-ENTMCNC: 32.4 G/DL (ref 28.4–34.8)
MCV RBC AUTO: 95.9 FL (ref 82.6–102.9)
MONOCYTES # BLD: 10 % (ref 3–12)
NRBC AUTOMATED: 0 PER 100 WBC
PDW BLD-RTO: 13.1 % (ref 11.8–14.4)
PLATELET # BLD: 184 K/UL (ref 138–453)
PLATELET ESTIMATE: ABNORMAL
PMV BLD AUTO: 9.9 FL (ref 8.1–13.5)
POTASSIUM SERPL-SCNC: 4.4 MMOL/L (ref 3.7–5.3)
PROSTATE SPECIFIC ANTIGEN: 2.43 UG/L
RBC # BLD: 4.67 M/UL (ref 4.21–5.77)
RBC # BLD: ABNORMAL 10*6/UL
SEG NEUTROPHILS: 64 % (ref 36–65)
SEGMENTED NEUTROPHILS ABSOLUTE COUNT: 3.3 K/UL (ref 1.5–8.1)
SEX HORMONE BINDING GLOBULIN: 76 NMOL/L (ref 11–80)
SODIUM BLD-SCNC: 143 MMOL/L (ref 135–144)
TESTOSTERONE FREE-NONMALE: 82.4 PG/ML (ref 47–244)
TESTOSTERONE TOTAL: 680 NG/DL (ref 220–1000)
TOTAL PROTEIN: 6.9 G/DL (ref 6.4–8.3)
TRIGL SERPL-MCNC: 56 MG/DL
TSH SERPL DL<=0.05 MIU/L-ACNC: 4.5 MIU/L (ref 0.3–5)
VLDLC SERPL CALC-MCNC: NORMAL MG/DL (ref 1–30)
WBC # BLD: 5.2 K/UL (ref 3.5–11.3)
WBC # BLD: ABNORMAL 10*3/UL

## 2018-04-02 PROCEDURE — 90670 PCV13 VACCINE IM: CPT | Performed by: FAMILY MEDICINE

## 2018-04-02 PROCEDURE — 99214 OFFICE O/P EST MOD 30 MIN: CPT | Performed by: FAMILY MEDICINE

## 2018-04-02 PROCEDURE — G0009 ADMIN PNEUMOCOCCAL VACCINE: HCPCS | Performed by: FAMILY MEDICINE

## 2018-04-02 RX ORDER — CLOBETASOL PROPIONATE 0.46 MG/ML
SOLUTION TOPICAL
Refills: 0 | COMMUNITY
Start: 2018-03-18 | End: 2018-04-02 | Stop reason: ALTCHOICE

## 2018-04-02 ASSESSMENT — ENCOUNTER SYMPTOMS
WHEEZING: 0
SORE THROAT: 0
BLOOD IN STOOL: 0
RHINORRHEA: 0
COUGH: 0
CONSTIPATION: 0
SHORTNESS OF BREATH: 0
ABDOMINAL PAIN: 0
VOMITING: 0
DIARRHEA: 0
NAUSEA: 0

## 2018-04-16 ENCOUNTER — OFFICE VISIT (OUTPATIENT)
Dept: PULMONOLOGY | Age: 68
End: 2018-04-16
Payer: MEDICARE

## 2018-04-16 VITALS
SYSTOLIC BLOOD PRESSURE: 142 MMHG | WEIGHT: 181.8 LBS | OXYGEN SATURATION: 98 % | TEMPERATURE: 97.4 F | HEART RATE: 60 BPM | BODY MASS INDEX: 22.6 KG/M2 | HEIGHT: 75 IN | DIASTOLIC BLOOD PRESSURE: 78 MMHG | RESPIRATION RATE: 16 BRPM

## 2018-04-16 DIAGNOSIS — J45.30 MILD PERSISTENT ASTHMA WITHOUT COMPLICATION: ICD-10-CM

## 2018-04-16 DIAGNOSIS — J30.2 CHRONIC SEASONAL ALLERGIC RHINITIS, UNSPECIFIED TRIGGER: Primary | ICD-10-CM

## 2018-04-16 PROCEDURE — 99213 OFFICE O/P EST LOW 20 MIN: CPT | Performed by: NURSE PRACTITIONER

## 2018-04-16 RX ORDER — FLUTICASONE PROPIONATE 50 MCG
2 SPRAY, SUSPENSION (ML) NASAL DAILY
Qty: 1 BOTTLE | Refills: 11 | Status: SHIPPED | OUTPATIENT
Start: 2018-04-16 | End: 2019-04-15 | Stop reason: SDUPTHER

## 2018-04-16 RX ORDER — MONTELUKAST SODIUM 10 MG/1
10 TABLET ORAL DAILY
Qty: 30 TABLET | Refills: 11 | Status: SHIPPED | OUTPATIENT
Start: 2018-04-16 | End: 2019-04-15 | Stop reason: SDUPTHER

## 2018-04-16 RX ORDER — ALBUTEROL SULFATE 90 UG/1
2 AEROSOL, METERED RESPIRATORY (INHALATION) EVERY 6 HOURS PRN
Qty: 1 INHALER | Refills: 11 | Status: SHIPPED | OUTPATIENT
Start: 2018-04-16 | End: 2020-06-11

## 2018-04-16 ASSESSMENT — ENCOUNTER SYMPTOMS: SHORTNESS OF BREATH: 1

## 2018-05-25 ENCOUNTER — OFFICE VISIT (OUTPATIENT)
Dept: FAMILY MEDICINE CLINIC | Age: 68
End: 2018-05-25
Payer: MEDICARE

## 2018-05-25 VITALS
HEART RATE: 60 BPM | RESPIRATION RATE: 16 BRPM | DIASTOLIC BLOOD PRESSURE: 63 MMHG | SYSTOLIC BLOOD PRESSURE: 113 MMHG | BODY MASS INDEX: 22.13 KG/M2 | HEIGHT: 75 IN | WEIGHT: 178 LBS | OXYGEN SATURATION: 97 % | TEMPERATURE: 98.9 F

## 2018-05-25 DIAGNOSIS — R19.5 POSITIVE FIT (FECAL IMMUNOCHEMICAL TEST): ICD-10-CM

## 2018-05-25 DIAGNOSIS — N52.9 ERECTILE DYSFUNCTION, UNSPECIFIED ERECTILE DYSFUNCTION TYPE: ICD-10-CM

## 2018-05-25 DIAGNOSIS — R13.10 DYSPHAGIA, UNSPECIFIED TYPE: Primary | ICD-10-CM

## 2018-05-25 DIAGNOSIS — Z12.11 SCREENING FOR COLON CANCER: ICD-10-CM

## 2018-05-25 DIAGNOSIS — N40.0 BENIGN PROSTATIC HYPERPLASIA, UNSPECIFIED WHETHER LOWER URINARY TRACT SYMPTOMS PRESENT: ICD-10-CM

## 2018-05-25 PROCEDURE — 99214 OFFICE O/P EST MOD 30 MIN: CPT | Performed by: FAMILY MEDICINE

## 2018-05-25 ASSESSMENT — PATIENT HEALTH QUESTIONNAIRE - PHQ9
SUM OF ALL RESPONSES TO PHQ9 QUESTIONS 1 & 2: 0
1. LITTLE INTEREST OR PLEASURE IN DOING THINGS: 0
SUM OF ALL RESPONSES TO PHQ QUESTIONS 1-9: 0
2. FEELING DOWN, DEPRESSED OR HOPELESS: 0

## 2018-05-25 ASSESSMENT — ENCOUNTER SYMPTOMS
RHINORRHEA: 0
SORE THROAT: 0
COUGH: 0
DIARRHEA: 0
CONSTIPATION: 0
WHEEZING: 0
NAUSEA: 0
SHORTNESS OF BREATH: 0
VOMITING: 0
BLOOD IN STOOL: 0

## 2018-05-28 PROBLEM — N40.0 BENIGN PROSTATIC HYPERPLASIA: Status: ACTIVE | Noted: 2018-05-28

## 2018-06-05 ENCOUNTER — TELEPHONE (OUTPATIENT)
Dept: FAMILY MEDICINE CLINIC | Age: 68
End: 2018-06-05

## 2018-06-05 DIAGNOSIS — R19.5 POSITIVE FIT (FECAL IMMUNOCHEMICAL TEST): ICD-10-CM

## 2018-06-05 DIAGNOSIS — E03.9 HYPOTHYROIDISM (ACQUIRED): ICD-10-CM

## 2018-06-05 DIAGNOSIS — Z12.11 SCREENING FOR COLON CANCER: ICD-10-CM

## 2018-06-05 DIAGNOSIS — R13.10 DYSPHAGIA, UNSPECIFIED TYPE: Primary | ICD-10-CM

## 2018-06-05 RX ORDER — LEVOTHYROXINE SODIUM 0.05 MG/1
TABLET ORAL
Qty: 30 TABLET | Refills: 2 | Status: SHIPPED | OUTPATIENT
Start: 2018-06-05 | End: 2018-07-26 | Stop reason: SDUPTHER

## 2018-06-12 DIAGNOSIS — B00.9 HSV-2 INFECTION: ICD-10-CM

## 2018-06-12 RX ORDER — VALACYCLOVIR HYDROCHLORIDE 500 MG/1
TABLET, FILM COATED ORAL
Qty: 35 TABLET | Refills: 3 | Status: ON HOLD | OUTPATIENT
Start: 2018-06-12 | End: 2018-07-24 | Stop reason: ALTCHOICE

## 2018-07-24 ENCOUNTER — ANESTHESIA (OUTPATIENT)
Dept: ENDOSCOPY | Age: 68
End: 2018-07-24
Payer: MEDICARE

## 2018-07-24 ENCOUNTER — ANESTHESIA EVENT (OUTPATIENT)
Dept: ENDOSCOPY | Age: 68
End: 2018-07-24
Payer: MEDICARE

## 2018-07-24 ENCOUNTER — HOSPITAL ENCOUNTER (OUTPATIENT)
Age: 68
Setting detail: OUTPATIENT SURGERY
Discharge: HOME | End: 2018-07-24
Attending: INTERNAL MEDICINE | Admitting: INTERNAL MEDICINE
Payer: MEDICARE

## 2018-07-24 VITALS
RESPIRATION RATE: 14 BRPM | HEART RATE: 57 BPM | DIASTOLIC BLOOD PRESSURE: 67 MMHG | OXYGEN SATURATION: 100 % | TEMPERATURE: 97.6 F | SYSTOLIC BLOOD PRESSURE: 101 MMHG

## 2018-07-24 VITALS
OXYGEN SATURATION: 98 % | SYSTOLIC BLOOD PRESSURE: 84 MMHG | RESPIRATION RATE: 13 BRPM | DIASTOLIC BLOOD PRESSURE: 55 MMHG

## 2018-07-24 PROCEDURE — 2500000003 HC RX 250 WO HCPCS: Performed by: NURSE ANESTHETIST, CERTIFIED REGISTERED

## 2018-07-24 PROCEDURE — 6360000002 HC RX W HCPCS: Performed by: NURSE ANESTHETIST, CERTIFIED REGISTERED

## 2018-07-24 PROCEDURE — 3700000001 HC ADD 15 MINUTES (ANESTHESIA): Performed by: INTERNAL MEDICINE

## 2018-07-24 PROCEDURE — 88305 TISSUE EXAM BY PATHOLOGIST: CPT

## 2018-07-24 PROCEDURE — 3609019800 HC COLONOSCOPY WITH SUBMUCOSAL INJECTION: Performed by: INTERNAL MEDICINE

## 2018-07-24 PROCEDURE — 3609012800 HC EGD DIAGNOSTIC ONLY: Performed by: INTERNAL MEDICINE

## 2018-07-24 PROCEDURE — 7100000010 HC PHASE II RECOVERY - FIRST 15 MIN: Performed by: INTERNAL MEDICINE

## 2018-07-24 PROCEDURE — 3609010400 HC COLONOSCOPY POLYPECTOMY HOT BIOPSY: Performed by: INTERNAL MEDICINE

## 2018-07-24 PROCEDURE — C1773 RET DEV, INSERTABLE: HCPCS | Performed by: INTERNAL MEDICINE

## 2018-07-24 PROCEDURE — 2709999900 HC NON-CHARGEABLE SUPPLY: Performed by: INTERNAL MEDICINE

## 2018-07-24 PROCEDURE — 7100000011 HC PHASE II RECOVERY - ADDTL 15 MIN: Performed by: INTERNAL MEDICINE

## 2018-07-24 PROCEDURE — 3700000000 HC ANESTHESIA ATTENDED CARE: Performed by: INTERNAL MEDICINE

## 2018-07-24 PROCEDURE — 2580000003 HC RX 258: Performed by: INTERNAL MEDICINE

## 2018-07-24 RX ORDER — GLYCOPYRROLATE 1 MG/5 ML
SYRINGE (ML) INTRAVENOUS PRN
Status: DISCONTINUED | OUTPATIENT
Start: 2018-07-24 | End: 2018-07-24 | Stop reason: SDUPTHER

## 2018-07-24 RX ORDER — PROPOFOL 10 MG/ML
INJECTION, EMULSION INTRAVENOUS PRN
Status: DISCONTINUED | OUTPATIENT
Start: 2018-07-24 | End: 2018-07-24 | Stop reason: SDUPTHER

## 2018-07-24 RX ORDER — SODIUM CHLORIDE 9 MG/ML
INJECTION, SOLUTION INTRAVENOUS CONTINUOUS
Status: DISCONTINUED | OUTPATIENT
Start: 2018-07-24 | End: 2018-07-24 | Stop reason: HOSPADM

## 2018-07-24 RX ORDER — LIDOCAINE HYDROCHLORIDE 10 MG/ML
INJECTION, SOLUTION EPIDURAL; INFILTRATION; INTRACAUDAL; PERINEURAL PRN
Status: DISCONTINUED | OUTPATIENT
Start: 2018-07-24 | End: 2018-07-24 | Stop reason: SDUPTHER

## 2018-07-24 RX ADMIN — Medication 0.2 MG: at 07:56

## 2018-07-24 RX ADMIN — SODIUM CHLORIDE: 9 INJECTION, SOLUTION INTRAVENOUS at 06:53

## 2018-07-24 RX ADMIN — LIDOCAINE HYDROCHLORIDE 70 MG: 10 INJECTION, SOLUTION EPIDURAL; INFILTRATION; INTRACAUDAL at 07:43

## 2018-07-24 RX ADMIN — Medication 0.2 MG: at 08:05

## 2018-07-24 RX ADMIN — PROPOFOL 450 MG: 10 INJECTION, EMULSION INTRAVENOUS at 07:45

## 2018-07-24 ASSESSMENT — PAIN SCALES - GENERAL
PAINLEVEL_OUTOF10: 0

## 2018-07-24 ASSESSMENT — PAIN - FUNCTIONAL ASSESSMENT: PAIN_FUNCTIONAL_ASSESSMENT: 0-10

## 2018-07-24 NOTE — ANESTHESIA PRE PROCEDURE
Department of Anesthesiology  Preprocedure Note       Name:  Chi Holder   Age:  76 y.o.  :  1950                                          MRN:  [id number]         Date:  2018      Surgeon: William Guzman):  Ling Carolina MD    Procedure: Procedure(s):  EGD  COLONOSCOPY    Medications prior to admission:   Prior to Admission medications    Medication Sig Start Date End Date Taking? Authorizing Provider   levothyroxine (SYNTHROID) 50 MCG tablet take 1 tablet by mouth once daily 18  Yes Tonny Rodríguez DO   albuterol sulfate HFA (PROAIR HFA) 108 (90 Base) MCG/ACT inhaler Inhale 2 puffs into the lungs every 6 hours as needed for Wheezing 18  Yes Albaro Spray, APRN - CNP   fluticasone (FLONASE) 50 MCG/ACT nasal spray 2 sprays by Nasal route daily 18  Yes Albaro Spray, APRN - CNP   fluticasone-salmeterol (ADVAIR DISKUS) 250-50 MCG/DOSE AEPB Inhale 1 puff into the lungs 2 times daily 18  Yes Albaro [place], APRN - CNP   montelukast (SINGULAIR) 10 MG tablet Take 1 tablet by mouth daily  Patient taking differently: Take 50 mg by mouth daily  18  Yes Albaro Spray, APRN - CNP   aspirin EC 81 MG EC tablet Take 81 mg by mouth every morning (before breakfast)   Yes Historical Provider, MD   celecoxib (CELEBREX) 100 MG capsule Take 1-2 capsules by mouth daily as needed for Pain Take with food.  16  Yes Tonny Sauceda DO   sildenafil (VIAGRA) 50 MG tablet Take 1 tablet by mouth as needed for Erectile Dysfunction 18   Thanh Khna DO       Current medications:    Current Facility-Administered Medications   Medication Dose Route Frequency Provider Last Rate Last Dose    0.9 % sodium chloride infusion   Intravenous Continuous Ling Carolina  mL/hr at 18 2742         Allergies:  No Known Allergies    Problem List:    Patient Active Problem List   Diagnosis Code    Mild intermittent asthma without complication Q07.54    Venous stasis ulcer of ankle ([facility] [address] I83.003  Dry ear canal H61.899    Irritation of left eye H57.8    Hx of factor V Leiden mutation Z86.2    Hypercholesteremia E78.00    Abnormal EKG R94.31    Acute pain of right knee M25.561    Acute conjunctivitis of both eyes H10.33    Positive FIT (fecal immunochemical test) R19.5    Acquired hypothyroidism E03.9    Perennial allergic rhinitis J30.89    Erectile dysfunction N52.9    Benign prostatic hyperplasia N40.0       Past Medical History:        Diagnosis Date    Anxiety     remote, not ongoing    Asthma 1990    Deep vein thrombosis (DVT) of right lower extremity ([facility] [address] 1997    Depression     remote, not ongoing    Early stage skin cancer 2012    excised by dermatology then.      Environmental allergies     Factor V deficiency ([facility] [address]     Hx of factor V Leiden mutation 10/20/2016    Hypercholesteremia     Hypothyroidism     Skin cancer     skin       Past Surgical History:        Procedure Laterality Date    COLONOSCOPY  07/24/2018    ENDOSCOPY, COLON, DIAGNOSTIC  07/24/2018    VASECTOMY  1988       Social History:    Social History   Substance Use Topics    Smoking status: Former Smoker     Packs/day: 2.00     Years: 23.00     Types: Cigarettes     Quit date: 3/30/1986    Smokeless tobacco: Never Used    Alcohol use Yes      Comment: socially                                Counseling given: Not Answered      Vital Signs (Current):   Vitals:    07/24/18 0646   BP: 117/81   Pulse: 55   Resp: 13   SpO2: 99%                                              BP Readings from Last 3 Encounters:   07/24/18 117/81   05/25/18 113/63   04/16/18 (!) 142/78       NPO Status: Time of last liquid consumption: 0330                        Time of last solid consumption: 2200                        Date of last liquid consumption: 07/24/18                        Date of last solid food consumption: 07/22/18    BMI:   Wt Readings from Last 3 Encounters:   05/25/18 178 lb (80.7 kg)   04/16/18 181 lb 12.8 oz (82.5 kg)   04/02/18 183 lb (83 kg)     There is no height or weight on file to calculate BMI.    CBC:   Lab Results   Component Value Date    WBC 5.2 04/02/2018    RBC 4.67 04/02/2018    HGB 14.5 04/02/2018    HCT 44.8 04/02/2018    MCV 95.9 04/02/2018    RDW 13.1 04/02/2018     04/02/2018       CMP:   Lab Results   Component Value Date     04/02/2018    K 4.4 04/02/2018     04/02/2018    CO2 24 04/02/2018    BUN 17 04/02/2018    CREATININE 1.11 04/02/2018    GFRAA >60 04/02/2018    LABGLOM >60 04/02/2018    GLUCOSE 94 04/02/2018    PROT 6.9 04/02/2018    CALCIUM 8.9 04/02/2018    BILITOT 0.28 04/02/2018    ALKPHOS 30 04/02/2018    AST 17 04/02/2018    ALT 15 04/02/2018       POC Tests: No results for input(s): POCGLU, POCNA, POCK, POCCL, POCBUN, POCHEMO, POCHCT in the last 72 hours. Coags: No results found for: PROTIME, INR, APTT    HCG (If Applicable): No results found for: PREGTESTUR, PREGSERUM, HCG, HCGQUANT     ABGs: No results found for: PHART, PO2ART, LVR8RBJ, BRY9GNJ, BEART, Z6XWHMWW     Type & Screen (If Applicable):  No results found for: LABABO, LABRH    Anesthesia Evaluation    Airway: Mallampati: I  TM distance: >3 FB   Neck ROM: full  Mouth opening: > = 3 FB Dental: normal exam         Pulmonary:normal exam    (+) pneumonia: no interval change,  asthma: exercise-induced asthma,                            Cardiovascular:                      Neuro/Psych:               GI/Hepatic/Renal:             Endo/Other:    (+) hypothyroidism::., .                 Abdominal:           Vascular:                                        Anesthesia Plan      MAC     ASA 2       Induction: intravenous. Anesthetic plan and risks discussed with patient. Plan discussed with attending.                   KRISTI Venegas CRNA   7/24/2018

## 2018-07-24 NOTE — OP NOTE
[facility] Endoscopy  COLONOSCOPY    Patient: Frantz Toribio  :    1950    Date:  2018     Procedure:  Colonoscopy with Hot snare polypectomy and Endoscopic marking    Indication:  Screening    Findings   1. Decreased sphincter tone  2. Hypertrophied anal papilla  3. Hemorrhoids  4. Extensive diverticulosis  5. Large (1.3 cm) pedunculated polyp in the distal descending colon. Resected with hot snare. Tattooed with 3 ml Spot. 6. Diminutive polyps (2) in the ascending colon. Obliterated with the tip of the snare. Recommendations  1. Await pathology. 2. Repeat colonoscopy in one (1) years. Repeat colonoscopy sooner if high-risk pathology is found. 3. Two day prep will be needed. Anesthesia:  MAC    Complications:    Sub-optimal bowel prep, poor in spots  Description of Procedure:  Informed consent was obtained after explanation of the procedure including indications, description of the procedure,  benefits and possible risks and complications of the procedure, and alternatives. All questions were answered. The patient's history was reviewed and a directed physical examination was performed prior to the procedure. ASA: 2. Anticoagulants: ASA 81 was stopped 1 days before the procedure. The patient was monitored throughout the procedure with pulse oximetry and periodic assessment of vital signs. With the patient initially in the left lateral decubitus position, a digital rectal examination was performed. The video endoscope was placed in the patient's rectum and advanced without difficulty  to the terminal ileum. The prep was fair. Examination of the mucosa and the anatomy was performed during both introduction and withdrawal of the endoscope.        Stephanie Barraza M.D.  2018 at 7:34 AM

## 2018-07-24 NOTE — H&P
needed for Erectile Dysfunction 2/8/18   Radha Kathy DO     Allergies  has No Known Allergies. Family History  family history includes Dementia in his paternal grandfather; Other in his paternal grandfather. Social History   reports that he quit smoking about 32 years ago. His smoking use included Cigarettes. He has a 46.00 pack-year smoking history. He has never used smokeless tobacco.  Up to 2 PPD for 23 years, quit 1986. reports that he drinks alcohol. Socially. reports that he does not use drugs. Marital Status   Occupation self employed    OBJECTIVE:   VITALS:  blood pressure is 117/81 and his pulse is 55. His respiration is 13 and oxygen saturation is 99%. CONSTITUTIONAL:alert & oriented x 3, no acute distress. Very pleasant and talkative. SKIN:  Warm and dry, no rashes. HEAD:  Normocephalic, atraumatic   EYES: PERRL. EOMs intact. EARS:  Hearing grossly WNL. NOSE:  Nares patent. No rhinorrhea   MOUTH/THROAT:  benign  NECK:supple, no lymphadenopathy  LUNGS: Clear to auscultation bilaterally, no wheezes. CARDIOVASCULAR: Heart sounds are normal.  Regular rate and rhythm without murmur. ABDOMEN: soft, non tender, non distended. EXTREMITIES: no edema bilateral lower extremities. IMPRESSIONS:   1. Screening; GERD  2.  has a past medical history of Anxiety; Asthma (1990); Deep vein thrombosis (DVT) of right lower extremity ([address] (1997); Depression; Early stage skin cancer (2012); Environmental allergies; Factor V deficiency ([facility] [address] factor V Leiden mutation (10/20/2016); Hypercholesteremia; Hypothyroidism; and Skin cancer. PLANS:   1.  Colonoscopy;  EGD    JENY JACOBO PA-C  Electronically signed 7/24/2018 at 6:53 AM

## 2018-07-25 LAB — SURGICAL PATHOLOGY REPORT: NORMAL

## 2018-07-26 DIAGNOSIS — E03.9 HYPOTHYROIDISM (ACQUIRED): ICD-10-CM

## 2018-07-26 RX ORDER — LEVOTHYROXINE SODIUM 0.05 MG/1
TABLET ORAL
Qty: 30 TABLET | Refills: 5 | Status: SHIPPED | OUTPATIENT
Start: 2018-07-26

## 2018-09-24 ENCOUNTER — OFFICE VISIT (OUTPATIENT)
Dept: FAMILY MEDICINE CLINIC | Age: 68
End: 2018-09-24
Payer: MEDICARE

## 2018-09-24 VITALS
DIASTOLIC BLOOD PRESSURE: 70 MMHG | TEMPERATURE: 97.1 F | WEIGHT: 181 LBS | OXYGEN SATURATION: 99 % | HEART RATE: 48 BPM | BODY MASS INDEX: 22.5 KG/M2 | SYSTOLIC BLOOD PRESSURE: 110 MMHG | HEIGHT: 75 IN | RESPIRATION RATE: 14 BRPM

## 2018-09-24 DIAGNOSIS — Z00.00 ROUTINE GENERAL MEDICAL EXAMINATION AT A HEALTH CARE FACILITY: Primary | ICD-10-CM

## 2018-09-24 DIAGNOSIS — Z13.6 SCREENING FOR AAA (ABDOMINAL AORTIC ANEURYSM): ICD-10-CM

## 2018-09-24 DIAGNOSIS — Z23 NEED FOR PROPHYLACTIC VACCINATION AND INOCULATION AGAINST VARICELLA: ICD-10-CM

## 2018-09-24 PROCEDURE — G0439 PPPS, SUBSEQ VISIT: HCPCS | Performed by: FAMILY MEDICINE

## 2018-09-24 ASSESSMENT — ANXIETY QUESTIONNAIRES: GAD7 TOTAL SCORE: 0

## 2018-09-24 ASSESSMENT — LIFESTYLE VARIABLES
HOW OFTEN DURING THE LAST YEAR HAVE YOU FOUND THAT YOU WERE NOT ABLE TO STOP DRINKING ONCE YOU HAD STARTED: 0
HOW MANY STANDARD DRINKS CONTAINING ALCOHOL DO YOU HAVE ON A TYPICAL DAY: 0
HAVE YOU OR SOMEONE ELSE BEEN INJURED AS A RESULT OF YOUR DRINKING: 0
HOW OFTEN DURING THE LAST YEAR HAVE YOU NEEDED AN ALCOHOLIC DRINK FIRST THING IN THE MORNING TO GET YOURSELF GOING AFTER A NIGHT OF HEAVY DRINKING: 0
AUDIT-C TOTAL SCORE: 2
HOW OFTEN DURING THE LAST YEAR HAVE YOU HAD A FEELING OF GUILT OR REMORSE AFTER DRINKING: 0
HOW OFTEN DO YOU HAVE A DRINK CONTAINING ALCOHOL: 2
HOW OFTEN DURING THE LAST YEAR HAVE YOU FAILED TO DO WHAT WAS NORMALLY EXPECTED FROM YOU BECAUSE OF DRINKING: 0
HOW OFTEN DURING THE LAST YEAR HAVE YOU BEEN UNABLE TO REMEMBER WHAT HAPPENED THE NIGHT BEFORE BECAUSE YOU HAD BEEN DRINKING: 0
HOW OFTEN DO YOU HAVE SIX OR MORE DRINKS ON ONE OCCASION: 0
AUDIT TOTAL SCORE: 2
HAS A RELATIVE, FRIEND, DOCTOR, OR ANOTHER HEALTH PROFESSIONAL EXPRESSED CONCERN ABOUT YOUR DRINKING OR SUGGESTED YOU CUT DOWN: 0

## 2018-09-24 ASSESSMENT — PATIENT HEALTH QUESTIONNAIRE - PHQ9
SUM OF ALL RESPONSES TO PHQ QUESTIONS 1-9: 0
SUM OF ALL RESPONSES TO PHQ QUESTIONS 1-9: 0

## 2018-09-24 NOTE — PATIENT INSTRUCTIONS
Advance Directives: Care Instructions  Your Care Instructions  An advance directive is a legal way to state your wishes at the end of your life. It tells your family and your doctor what to do if you can no longer say what you want. There are two main types of advance directives. You can change them any time that your wishes change. · A living will tells your family and your doctor your wishes about life support and other treatment. · A durable power of  for health care lets you name a person to make treatment decisions for you when you can't speak for yourself. This person is called a health care agent. If you do not have an advance directive, decisions about your medical care may be made by a doctor or a  who doesn't know you. It may help to think of an advance directive as a gift to the people who care for you. If you have one, they won't have to make tough decisions by themselves. Follow-up care is a key part of your treatment and safety. Be sure to make and go to all appointments, and call your doctor if you are having problems. It's also a good idea to know your test results and keep a list of the medicines you take. How can you care for yourself at home? · Discuss your wishes with your loved ones and your doctor. This way, there are no surprises. · Many states have a unique form. Or you might use a universal form that has been approved by many states. This kind of form can sometimes be completed and stored online. Your electronic copy will then be available wherever you have a connection to the Internet. In most cases, doctors will respect your wishes even if you have a form from a different state. · You don't need a  to do an advance directive. But you may want to get legal advice. · Think about these questions when you prepare an advance directive:  ¨ Who do you want to make decisions about your medical care if you are not able to?  Many people choose a family member or close friend. ¨ Do you know enough about life support methods that might be used? If not, talk to your doctor so you understand. ¨ What are you most afraid of that might happen? You might be afraid of having pain, losing your independence, or being kept alive by machines. ¨ Where would you prefer to die? Choices include your home, a hospital, or a nursing home. ¨ Would you like to have information about hospice care to support you and your family? ¨ Do you want to donate organs when you die? ¨ Do you want certain Anglican practices performed before you die? If so, put your wishes in the advance directive. · Read your advance directive every year, and make changes as needed. When should you call for help? Be sure to contact your doctor if you have any questions. Where can you learn more? Go to https://[email or web address].[email or web address]. org and sign in to your [employer] account. Enter R264 in the [employer]ite box to learn more about \"Advance Directives: Care Instructions. \"     If you do not have an account, please click on the \"Sign Up Now\" link. Current as of: October 6, 2017  Content Version: 11.7  © 4024-8684 [employer]. Care instructions adapted under license by [facility] ([facility]). If you have questions about a medical condition or this instruction, always ask your healthcare professional. [address] any warranty or liability for your use of this information. Learning About Durable Power of  for Health Care  What is a durable power of  for health care? A durable power of  for health care is one type of the legal forms called advance directives. It lets you decide who you want to make treatment decisions for you if you cannot speak or decide for yourself. The person you choose is called your health care agent. Another type of advance directive is a living will.  It lets you write down what kinds of treatment or life support you want or do not want. What should you think about when choosing a health care agent? Choose your health care agent carefully. This person may or may not be a family member. Talk to the person before you make your final decision. Make sure he or she is comfortable with this responsibility. It's a good idea to choose someone who:  · Is at least 25years old. · Knows you well and understands what makes life meaningful for you. · Understands your Orthodoxy and moral values. · Will do what you want, not what he or she wants. · Will be able to make difficult choices at a stressful time. · Will be able to refuse or stop treatment, if that is what you would want, even if you could die. · Will be firm and confident with health professionals if needed. · Will ask questions to get necessary information. · Lives near you or agrees to travel to you if needed. Your family may help you make medical decisions while you can still be part of that process. But it is important to choose one person to be your health care agent in case you are not able to make decisions for yourself. If you don't fill out the legal form and name a health care agent, the decisions your family can make may be limited. Who will make decisions for you if you do not have a health care agent? If you don't have a health care agent or a living will, your family members may disagree about your medical care. And then some medical professionals who may not know you as well might have to make decisions for you. In some cases, a  makes the decisions. When you name a health care agent, it is very clear who has the power to make health decisions for you. How do you name a health care agent? You name your health care agent on a legal form. It is usually called a durable power of  for health care. Ask your hospital, state bar association, or office on aging where to find these forms.   You must sign the form to make it legal. Some states require you to get the form notarized. This means that a person called a  watches you sign the form and then he or she signs the form. Some states also require that two or more witnesses sign the form. Be sure to tell your family members and doctors who your health care agent is. Keep your forms in a safe place. But make sure that your loved ones know where the forms are. This could be in your desk where you keep other important papers. Make sure your doctor has a copy of your forms. Where can you learn more? Go to https://chpepiceweb.Local Dirt. org and sign in to your [employer] account. Enter [id number] in the [employer] box to learn more about \"Learning About Durable Power of  for Health Care. \"     If you do not have an account, please click on the \"Sign Up Now\" link. Current as of: October 6, 2017  Content Version: 11.7  © 0809-4966 [employer]. Care instructions adapted under license by [facility] ([facility]). If you have questions about a medical condition or this instruction, always ask your healthcare professional. [place] [address] any warranty or liability for your use of this information. Learning About Fabio Chaparro  What is a living will? A living will is a legal form you use to write down the kind of care you want at the end of your life. It is used by the health professionals who will treat you if you aren't able to decide for yourself. If you put your wishes in writing, your loved ones and others will know what kind of care you want. They won't need to guess. This can ease your mind and be helpful to others. A living will is not the same as an estate or property will. An estate will explains what you want to happen with your money and property after you die. Is a living will a legal document? A living will is a legal document. Each state has its own laws about living torres.  If you move to another state, make sure that your living will is legal in the state where you now live. Or you might use a universal form that has been approved by many states. This kind of form can sometimes be completed and stored online. Your electronic copy will then be available wherever you have a connection to the Internet. In most cases, doctors will respect your wishes even if you have a form from a different state. · You don't need an  to complete a living will. But legal advice can be helpful if your state's laws are unclear, your health history is complicated, or your family can't agree on what should be in your living will. · You can change your living will at any time. Some people find that their wishes about end-of-life care change as their health changes. · In addition to making a living will, think about completing a medical power of  form. This form lets you name the person you want to make end-of-life treatment decisions for you (your \"health care agent\") if you're not able to. Many hospitals and nursing homes will give you the forms you need to complete a living will and a medical power of . · Your living will is used only if you can't make or communicate decisions for yourself anymore. If you become able to make decisions again, you can accept or refuse any treatment, no matter what you wrote in your living will. · Your state may offer an online registry. This is a place where you can store your living will online so the doctors and nurses who need to treat you can find it right away. What should you think about when creating a living will? Talk about your end-of-life wishes with your family members and your doctor. Let them know what you want. That way the people making decisions for you won't be surprised by your choices. Think about these questions as you make your living will:  · Do you know enough about life support methods that might be used?  If not, talk to your doctor so you know what might be done if you can't breathe benefits include a comprehensive review of your medical history including lifestyle, illnesses that may run in your family, and various assessments and screenings as appropriate. After reviewing your medical record and screening and assessments performed today your provider may have ordered immunizations, labs, imaging, and/or referrals for you. A list of these orders (if applicable) as well as your Preventive Care list are included within your After Visit Summary for your review. Other Preventive Recommendations:    · A preventive eye exam performed by an eye specialist is recommended every 1-2 years to screen for glaucoma; cataracts, macular degeneration, and other eye disorders. · A preventive dental visit is recommended every 6 months. · Try to get at least 150 minutes of exercise per week or 10,000 steps per day on a pedometer . · Order or download the FREE \"Exercise & Physical Activity: Your Everyday Guide\" from [employer]. Call 0-791.562.7740 or search [employer] online. · You need 3766-8489 mg of calcium and 5459-0095 IU of vitamin D per day. It is possible to meet your calcium requirement with diet alone, but a vitamin D supplement is usually necessary to meet this goal.  · When exposed to the sun, use a sunscreen that protects against both UVA and UVB radiation with an SPF of 30 or greater. Reapply every 2 to 3 hours or after sweating, drying off with a towel, or swimming. · Always wear a seat belt when traveling in a car. Always wear a helmet when riding a bicycle or motorcycle. Patient Education        Preventing Falls: Care Instructions  Your Care Instructions    Getting around your home safely can be a challenge if you have injuries or health problems that make it easy for you to fall. Loose rugs and furniture in walkways are among the dangers for many older people who have problems walking or who have poor eyesight.  People who have conditions such as arthritis, osteoporosis, or dementia also have to be careful not to fall. You can make your home safer with a few simple measures. Follow-up care is a key part of your treatment and safety. Be sure to make and go to all appointments, and call your doctor if you are having problems. It's also a good idea to know your test results and keep a list of the medicines you take. How can you care for yourself at home? Taking care of yourself  You may get dizzy if you do not drink enough water. To prevent dehydration, drink plenty of fluids, enough so that your urine is light yellow or clear like water. Choose water and other caffeine-free clear liquids. If you have kidney, heart, or liver disease and have to limit fluids, talk with your doctor before you increase the amount of fluids you drink. Exercise regularly to improve your strength, muscle tone, and balance. Walk if you can. Swimming may be a good choice if you cannot walk easily. Have your vision and hearing checked each year or any time you notice a change. If you have trouble seeing and hearing, you might not be able to avoid objects and could lose your balance. Know the side effects of the medicines you take. Ask your doctor or pharmacist whether the medicines you take can affect your balance. Sleeping pills or sedatives can affect your balance. Limit the amount of alcohol you drink. Alcohol can impair your balance and other senses. Ask your doctor whether calluses or corns on your feet need to be removed. If you wear loose-fitting shoes because of calluses or corns, you can lose your balance and fall. Talk to your doctor if you have numbness in your feet. Preventing falls at home  Remove raised doorway thresholds, throw rugs, and clutter. Repair loose carpet or raised areas in the floor. Move furniture and electrical cords to keep them out of walking paths. Use nonskid floor wax, and wipe up spills right away, especially on ceramic tile floors.   If

## 2018-09-24 NOTE — PROGRESS NOTES
deficiency ([facility]     Hx of factor V Leiden mutation 10/20/2016    Hypercholesteremia     Hypothyroidism     Skin cancer     skin     Past Surgical History:   Procedure Laterality Date    COLONOSCOPY  07/24/2018    COLONOSCOPY  7/24/2018    COLONOSCOPY POLYPECTOMY HOT BIOPSY performed by Imani Kelly MD at [facility]    ENDOSCOPY, COLON, DIAGNOSTIC  07/24/2018     [place] FLX WITH DIRECTED SUBMUCOSAL Benjy Anthony Ty 84 ANY SBST  7/24/2018    COLONOSCOPY SUBMUCOSAL/BOTOX INJECTION performed by Imani Kelly MD at [address] [place] [address] N/A 7/24/2018    EGD DIAGNOSTIC ONLY performed by Imani Kelly MD at [facility]     Family History   Problem Relation Age of Onset    Dementia Paternal Grandfather     Other Paternal Grandfather         ASDx       CareTeam (Including outside providers/suppliers regularly involved in providing care):   Patient Care Team:  Radha Arambula DO as PCP - General (Family Medicine)  Isabel Joshi DO as Consulting Physician (Pulmonology)  Valri Duane, MD (Dermatology)    Wt Readings from Last 3 Encounters:   09/24/18 181 lb (82.1 kg)   05/25/18 178 lb (80.7 kg)   04/16/18 181 lb 12.8 oz (82.5 kg)     Vitals:    09/24/18 1014   BP: 110/70   Pulse: (!) 48   Resp: 14   Temp: 97.1 °F (36.2 °C)   TempSrc: Oral   SpO2: 99%   Weight: 181 lb (82.1 kg)   Height: 6' 3\" (1.905 m)     Body mass index is 22.62 kg/m². Patient's complete Health Risk Assessment and screening values have been reviewed and are found in Flowsheets. The following problems were reviewed today and where indicated follow up appointments were made and/or referrals ordered.     Positive Risk Factor Screenings with Interventions:     General Health:  General  In general, how would you say your health is?: Excellent  In the past 7 days, have you experienced any of the following?: None of These  Do you get the social and emotional support that you need?: Yes  Do

## 2019-04-15 ENCOUNTER — OFFICE VISIT (OUTPATIENT)
Dept: PULMONOLOGY | Age: 69
End: 2019-04-15
Payer: MEDICARE

## 2019-04-15 VITALS
DIASTOLIC BLOOD PRESSURE: 72 MMHG | BODY MASS INDEX: 23 KG/M2 | HEART RATE: 56 BPM | RESPIRATION RATE: 14 BRPM | OXYGEN SATURATION: 98 % | HEIGHT: 75 IN | SYSTOLIC BLOOD PRESSURE: 124 MMHG | WEIGHT: 185 LBS

## 2019-04-15 DIAGNOSIS — R06.02 SHORTNESS OF BREATH: ICD-10-CM

## 2019-04-15 DIAGNOSIS — J30.2 CHRONIC SEASONAL ALLERGIC RHINITIS: ICD-10-CM

## 2019-04-15 DIAGNOSIS — J45.30 MILD PERSISTENT ASTHMA WITHOUT COMPLICATION: Primary | ICD-10-CM

## 2019-04-15 DIAGNOSIS — J30.2 SEASONAL ALLERGIES: ICD-10-CM

## 2019-04-15 PROCEDURE — 1036F TOBACCO NON-USER: CPT | Performed by: NURSE PRACTITIONER

## 2019-04-15 PROCEDURE — 4040F PNEUMOC VAC/ADMIN/RCVD: CPT | Performed by: NURSE PRACTITIONER

## 2019-04-15 PROCEDURE — G8420 CALC BMI NORM PARAMETERS: HCPCS | Performed by: NURSE PRACTITIONER

## 2019-04-15 PROCEDURE — 3017F COLORECTAL CA SCREEN DOC REV: CPT | Performed by: NURSE PRACTITIONER

## 2019-04-15 PROCEDURE — 99213 OFFICE O/P EST LOW 20 MIN: CPT | Performed by: NURSE PRACTITIONER

## 2019-04-15 PROCEDURE — G8427 DOCREV CUR MEDS BY ELIG CLIN: HCPCS | Performed by: NURSE PRACTITIONER

## 2019-04-15 PROCEDURE — 1123F ACP DISCUSS/DSCN MKR DOCD: CPT | Performed by: NURSE PRACTITIONER

## 2019-04-15 RX ORDER — FLUTICASONE PROPIONATE 50 MCG
2 SPRAY, SUSPENSION (ML) NASAL DAILY
Qty: 1 BOTTLE | Refills: 11 | Status: SHIPPED | OUTPATIENT
Start: 2019-04-15 | End: 2020-07-24

## 2019-04-15 RX ORDER — VALACYCLOVIR HYDROCHLORIDE 500 MG/1
TABLET, FILM COATED ORAL
Refills: 0 | COMMUNITY
Start: 2019-04-07 | End: 2020-07-24

## 2019-04-15 RX ORDER — PREDNISONE 10 MG/1
10 TABLET ORAL 2 TIMES DAILY
Qty: 10 TABLET | Refills: 0 | Status: SHIPPED | OUTPATIENT
Start: 2019-04-15 | End: 2019-04-20

## 2019-04-15 RX ORDER — MONTELUKAST SODIUM 10 MG/1
10 TABLET ORAL DAILY
Qty: 30 TABLET | Refills: 11 | Status: SHIPPED | OUTPATIENT
Start: 2019-04-15 | End: 2020-05-08 | Stop reason: SDUPTHER

## 2019-04-15 RX ORDER — AZITHROMYCIN 250 MG/1
250 TABLET, FILM COATED ORAL SEE ADMIN INSTRUCTIONS
Qty: 6 TABLET | Refills: 0 | Status: SHIPPED | OUTPATIENT
Start: 2019-04-15 | End: 2019-04-20

## 2019-04-15 RX ORDER — AZELASTINE HYDROCHLORIDE AND FLUTICASONE PROPIONATE 137; 50 UG/1; UG/1
SPRAY, METERED NASAL
Refills: 0 | COMMUNITY
Start: 2019-02-25 | End: 2019-08-01

## 2019-04-15 ASSESSMENT — ENCOUNTER SYMPTOMS
ALLERGIC/IMMUNOLOGIC NEGATIVE: 1
GASTROINTESTINAL NEGATIVE: 1
EYES NEGATIVE: 1

## 2019-04-15 NOTE — PROGRESS NOTES
Subjective:      Patient ID: Angela Porras is a 76 y.o. male. HPI:    Here as a sick call. Patient reports that over the last 1 week he has been feeling more congested, with postnasal drip. States that he's been having a \"allergic reaction to a cold\", dates that he woke up last night gasping for air and was only able to get his breathing started by repeatedly \"grunting/groaning\". He reports sensation of post nasal gtt, is not currently on any of his asthma medications (Advair, Fluticasone, Singulair) due to not feeling like it was helping. He continues with alternative therapies for his treatment. Lengthy discussion with patient regarding the chronic daily treatment to prevent short term flare ups of asthma. Patient is intermittently taking Dynamist per PCP, however insurance is not covering it. He denies symptoms of HELEN, states he was told he has recovered from it in the past, unclear when last sleep study was done. Endorses exertional shortness of breath relieved with ProAir inhaler prior to running. He denies cough, mucous production, hemoptysis. States he feels \"ache\" in chest, and that he needs an antibiotic. Lung sounds clear throughout, no adventitious sounds, patient asking for chest X-ray. No flowsheet data found. /72   Pulse 56   Resp 14   Ht 6' 3\" (1.905 m)   Wt 185 lb (83.9 kg)   SpO2 98% Comment: room air at rest  BMI 23.12 kg/m²     Past Medical History:   Diagnosis Date    Anxiety     remote, not ongoing    Asthma 1990    Cough     Deep vein thrombosis (DVT) of right lower extremity ([address] 1997    Depression     remote, not ongoing    Early stage skin cancer 2012    excised by dermatology then.      Environmental allergies     Factor V deficiency ([address]     Hx of factor V Leiden mutation 10/20/2016    Hypercholesteremia     Hypothyroidism     Skin cancer     skin    SOB (shortness of breath)     Wheezing      Past Surgical History:   Procedure Laterality Date    COLONOSCOPY  2018    COLONOSCOPY  2018    COLONOSCOPY POLYPECTOMY HOT BIOPSY performed by Dion Resendez MD at [facility]    ENDOSCOPY, COLON, DIAGNOSTIC  2018     [address] FLX WITH DIRECTED SUBMUCOSAL Benjy Anthony Ty 84 ANY SBST  2018    COLONOSCOPY SUBMUCOSAL/BOTOX INJECTION performed by Dion Resendez MD at [facility] N/A 2018    EGD DIAGNOSTIC ONLY performed by Dion Resendez MD at [address] [place] [address]        Family History   Problem Relation Age of Onset    Dementia Paternal Grandfather     Other Paternal Grandfather         ASDx       Social History     Socioeconomic History    Marital status:      Spouse name: Not on file    Number of children: Not on file    Years of education: Not on file    Highest education level: Not on file   Occupational History    Not on file   Social Needs    Financial resource strain: Not on file    Food insecurity:     Worry: Not on file     Inability: Not on file    Transportation needs:     Medical: Not on file     Non-medical: Not on file   Tobacco Use    Smoking status: Former Smoker     Packs/day: 2.00     Years: 23.00     Pack years: 46.00     Types: Cigarettes     Start date:      Last attempt to quit: 3/30/1986     Years since quittin.0    Smokeless tobacco: Never Used   Substance and Sexual Activity    Alcohol use: Yes     Comment: socially    Drug use: No    Sexual activity: Yes     Partners: Female     Comment: spouse   Lifestyle    Physical activity:     Days per week: Not on file     Minutes per session: Not on file    Stress: Not on file   Relationships    Social connections:     Talks on phone: Not on file     Gets together: Not on file     Attends [employer] service: Not on file     Active member of club or organization: Not on file     Attends meetings of clubs or organizations: Not on file     Relationship status: Not on file    Intimate partner violence:     Fear of current or ex partner: Not on file     Emotionally abused: Not on file     Physically abused: Not on file     Forced sexual activity: Not on file   Other Topics Concern    Not on file   Social History Narrative    Not on file       Review of Systems   Constitutional: Negative. HENT:        Post-nasal gtt   Eyes: Negative. Respiratory:        Exertional dyspnea, woke up coughing/gasping for air last night. Cardiovascular: Negative. Gastrointestinal: Negative. Endocrine: Negative. Genitourinary: Negative. Musculoskeletal: Negative. Skin: Negative. Allergic/Immunologic: Negative. Neurological: Negative. Hematological: Negative. Psychiatric/Behavioral: Negative.         Objective:      Physical Exam  General appearance - alert, well appearing, and in no distress, oriented to person, place, and time and normal appearing weight  Mental status - alert, oriented to person, place, and time, normal mood, behavior, speech, dress, motor activity, and thought processes  Eyes - pupils equal and reactive, extraocular eye movements intact  Ears - bilateral TM's and external ear canals normal  Nose - normal and patent, no erythema, discharge or polyps  Mouth - mucous membranes moist, pharynx normal without lesions  Neck - supple, no significant adenopathy  Chest - clear to auscultation, no wheezes, rales or rhonchi, symmetric air entry  Heart -normal rate, regular rhythm, normal S1, S2, no murmurs, rubs, clicks or gallops  Abdomen - soft, nontender, nondistended, no masses or organomegaly  Neurological - alert, oriented, normal speech, no focal findings or movement disorder noted}  Extremities - peripheral pulses normal, no pedal edema, no clubbing or cyanosis  Skin - normal coloration and turgor, no rashes, no suspicious skin lesions noted     Wt Readings from Last 3 Encounters:   04/15/19 185 lb (83.9 kg)   09/24/18 181 lb (82.1 kg)   05/25/18 178 lb (80.7 kg) Results for orders placed or performed during the hospital encounter of 07/24/18   Surgical Pathology   Result Value Ref Range    Surgical Pathology Report       (NOTE)  [id number]  [address] [place] [address],  [address]. [place], [address]  [phone]  Fax: [phone] [facility]    Patient Name: Claudia Mcfarland [facility] Rec: [id number]  Path Number: [id number]  Collected: 7/24/2018  Received: 7/24/2018  Reported: 7/25/2018 13:04    -- Diagnosis --  DESCENDING COLON, POLYPECTOMY:  - PEDUNCULATED ADENOMATOUS POLYP, EXCISED. José Miguel Martines. Fawad Larson,  **Electronically Signed Out**         sls/7/25/2018      Clinical Information  Pre-op Diagnosis:  SCREENING, DYSPHAGIA   Operative Findings:  DESCENDING COLON POLYP  Operation Performed:  EGD, COLONOSCOPY     Source of Specimen  1: DESCENDING COLON POLYP (A)    Gross Description  \"KALLI NIEVESW, DESCENDING COLON POLYP\" Reddish-brown, vaguely  lobulated, mushroom-shaped polyp, 1.5 x 1.0 x 0.9 cm. A 0.8 x 0.8 x  0.6 cm stalk is identified. It shows a markedly retracted resection  margin. It is inked black and bise cted. Entirely 2cs. tm      Microscopic Description  Microscopic examination performed. No results found. Assessment:      1. Mild persistent asthma without complication    2. Seasonal allergies    3. Chronic seasonal allergic rhinitis    4. Shortness of breath          Plan:      1. Medications reviewed, continue as ordered  2. Refills were provided - yes  3. Educated and clarified the medication use. 4. Recommend flu vaccination in the fall annually. Due in Fall  5. Recommendations given regarding pneumococcal vaccinations. Up to Date  6. Patient is up-to-date withvaccinations from pulmonary perspective. 7. Maintain an active lifestyle. 8. Patient's questions were answered to his satisfaction.   5. Former smoker quit 33 years ago with 43 pack year

## 2019-04-16 DIAGNOSIS — R06.02 SHORTNESS OF BREATH: ICD-10-CM

## 2019-04-17 ENCOUNTER — TELEPHONE (OUTPATIENT)
Dept: PULMONOLOGY | Age: 69
End: 2019-04-17

## 2019-04-17 NOTE — TELEPHONE ENCOUNTER
Patient called wanting to talk to [facility]. Told him I would leave a message for [facility] to call him back when she is in the office, patient wanted to call her directly. He wanted [facility] e-mail told him that he could send her a message in my chart. He stated it isn't user friendly and said it's to complicated to discuss with me on the phone. Again I offered to send he a message and he refused and disconnected our phone call. [facility] could you please call the patient when you have a moment.

## 2019-06-17 ENCOUNTER — OFFICE VISIT (OUTPATIENT)
Dept: PULMONOLOGY | Age: 69
End: 2019-06-17
Payer: MEDICARE

## 2019-06-17 VITALS
SYSTOLIC BLOOD PRESSURE: 134 MMHG | HEART RATE: 58 BPM | OXYGEN SATURATION: 99 % | HEIGHT: 75 IN | DIASTOLIC BLOOD PRESSURE: 75 MMHG | BODY MASS INDEX: 23.38 KG/M2 | WEIGHT: 188 LBS | TEMPERATURE: 98.4 F

## 2019-06-17 DIAGNOSIS — R09.82 POST-NASAL DRIP: ICD-10-CM

## 2019-06-17 DIAGNOSIS — J45.20 MILD INTERMITTENT ASTHMA WITHOUT COMPLICATION: Primary | ICD-10-CM

## 2019-06-17 DIAGNOSIS — J30.89 PERENNIAL ALLERGIC RHINITIS: ICD-10-CM

## 2019-06-17 DIAGNOSIS — S42.294D OTHER CLOSED NONDISPLACED FRACTURE OF PROXIMAL END OF RIGHT HUMERUS WITH ROUTINE HEALING, SUBSEQUENT ENCOUNTER: ICD-10-CM

## 2019-06-17 PROCEDURE — 1123F ACP DISCUSS/DSCN MKR DOCD: CPT | Performed by: INTERNAL MEDICINE

## 2019-06-17 PROCEDURE — 1036F TOBACCO NON-USER: CPT | Performed by: INTERNAL MEDICINE

## 2019-06-17 PROCEDURE — G8427 DOCREV CUR MEDS BY ELIG CLIN: HCPCS | Performed by: INTERNAL MEDICINE

## 2019-06-17 PROCEDURE — G8420 CALC BMI NORM PARAMETERS: HCPCS | Performed by: INTERNAL MEDICINE

## 2019-06-17 PROCEDURE — 4040F PNEUMOC VAC/ADMIN/RCVD: CPT | Performed by: INTERNAL MEDICINE

## 2019-06-17 PROCEDURE — 3017F COLORECTAL CA SCREEN DOC REV: CPT | Performed by: INTERNAL MEDICINE

## 2019-06-17 PROCEDURE — 99213 OFFICE O/P EST LOW 20 MIN: CPT | Performed by: INTERNAL MEDICINE

## 2019-06-17 ASSESSMENT — ENCOUNTER SYMPTOMS
EYES NEGATIVE: 1
WHEEZING: 1

## 2019-06-18 NOTE — PROGRESS NOTES
Subjective:      Patient ID: Fer Avila is a 71 y.o. male. HPI  Follow up visit for asthma. Was seen in a sick call 2 months ago with increasing shortness of breath and poorly controlled asthma. Reviewed medications. Now better on Advair 250/50, Singulair 10 mg nightly, Flonase, and albuterol. Unfortunately, about 6 weeks ago he fell and fractured his right humerus while crossing the finish line at a road race. Great difficulty using his inhalers. Things better now since he is able to move his right hand. No nocturnal symptoms. He is starting to run again. Planes about phlegm which he localizes to the base of his neck. He appears to have postnasal drip syndrome. Does not find Flonase to be very effective. Denies seasonal allergies. Reports that while visiting his daughter in [place] his symptoms were actually improved. .    Review of Systems   Constitutional: Negative. HENT: Positive for postnasal drip. Eyes: Negative. Respiratory: Positive for wheezing. Cardiovascular: Negative. Musculoskeletal:        Right arm pain   All other systems reviewed and are negative. Objective:     Physical Exam   Constitutional: He is oriented to person, place, and time. He appears well-developed and well-nourished. HENT:   Head: Normocephalic and atraumatic. Right Ear: External ear normal.   Left Ear: External ear normal.   Nose: Nose normal.   Mouth/Throat: Oropharynx is clear and moist. No oropharyngeal exudate. Eyes: Conjunctivae are normal. No scleral icterus. Neck: Neck supple. No JVD present. No tracheal deviation present. No thyromegaly present. Cardiovascular: Normal rate, regular rhythm and normal heart sounds. Exam reveals no gallop. No murmur heard. Pulmonary/Chest: Effort normal. No respiratory distress. He has no wheezes. He has no rales. He exhibits no tenderness. Abdominal: Soft. There is no tenderness. Musculoskeletal: He exhibits deformity. He exhibits no edema. effective. 3. Continue same asthma medication. 4. Return in 1 year. Sooner if new or advancing symptoms. 5. Flu shot in fall.      Electronically signed by Camelia Sumner DO on 6/17/2019at 9:19 PM

## 2019-08-01 ENCOUNTER — HOSPITAL ENCOUNTER (OUTPATIENT)
Dept: CARDIAC CATH/INVASIVE PROCEDURES | Age: 69
Discharge: HOME | End: 2019-08-01
Payer: MEDICARE

## 2019-08-01 VITALS
RESPIRATION RATE: 16 BRPM | HEIGHT: 75 IN | OXYGEN SATURATION: 97 % | SYSTOLIC BLOOD PRESSURE: 117 MMHG | TEMPERATURE: 96.6 F | HEART RATE: 46 BPM | BODY MASS INDEX: 23 KG/M2 | WEIGHT: 185 LBS | DIASTOLIC BLOOD PRESSURE: 64 MMHG

## 2019-08-01 LAB
GFR NON-AFRICAN AMERICAN: >60 ML/MIN
GFR SERPL CREATININE-BSD FRML MDRD: >60 ML/MIN
GFR SERPL CREATININE-BSD FRML MDRD: NORMAL ML/MIN/{1.73_M2}
GLUCOSE BLD-MCNC: 94 MG/DL (ref 74–100)
PLATELET # BLD: 165 K/UL (ref 138–453)
POC CHLORIDE: 106 MMOL/L (ref 98–107)
POC CREATININE: 1.13 MG/DL (ref 0.51–1.19)
POC HEMATOCRIT: 47 % (ref 41–53)
POC HEMOGLOBIN: 15.9 G/DL (ref 13.5–17.5)
POC POTASSIUM: 3.9 MMOL/L (ref 3.5–4.5)
POC SODIUM: 144 MMOL/L (ref 138–146)

## 2019-08-01 PROCEDURE — 84295 ASSAY OF SERUM SODIUM: CPT

## 2019-08-01 PROCEDURE — 6360000002 HC RX W HCPCS

## 2019-08-01 PROCEDURE — 84132 ASSAY OF SERUM POTASSIUM: CPT

## 2019-08-01 PROCEDURE — 7100000011 HC PHASE II RECOVERY - ADDTL 15 MIN

## 2019-08-01 PROCEDURE — 82435 ASSAY OF BLOOD CHLORIDE: CPT

## 2019-08-01 PROCEDURE — 93458 L HRT ARTERY/VENTRICLE ANGIO: CPT | Performed by: INTERNAL MEDICINE

## 2019-08-01 PROCEDURE — C1894 INTRO/SHEATH, NON-LASER: HCPCS

## 2019-08-01 PROCEDURE — 82947 ASSAY GLUCOSE BLOOD QUANT: CPT

## 2019-08-01 PROCEDURE — 82565 ASSAY OF CREATININE: CPT

## 2019-08-01 PROCEDURE — 7100000010 HC PHASE II RECOVERY - FIRST 15 MIN

## 2019-08-01 PROCEDURE — C1725 CATH, TRANSLUMIN NON-LASER: HCPCS

## 2019-08-01 PROCEDURE — 6360000004 HC RX CONTRAST MEDICATION

## 2019-08-01 PROCEDURE — 2709999900 HC NON-CHARGEABLE SUPPLY

## 2019-08-01 PROCEDURE — 2500000003 HC RX 250 WO HCPCS

## 2019-08-01 PROCEDURE — 85049 AUTOMATED PLATELET COUNT: CPT

## 2019-08-01 PROCEDURE — C1769 GUIDE WIRE: HCPCS

## 2019-08-01 PROCEDURE — 85014 HEMATOCRIT: CPT

## 2019-08-01 RX ORDER — SODIUM CHLORIDE 0.9 % (FLUSH) 0.9 %
10 SYRINGE (ML) INJECTION PRN
Status: DISCONTINUED | OUTPATIENT
Start: 2019-08-01 | End: 2019-08-02 | Stop reason: HOSPADM

## 2019-08-01 RX ORDER — ACETAMINOPHEN 325 MG/1
650 TABLET ORAL EVERY 4 HOURS PRN
Status: DISCONTINUED | OUTPATIENT
Start: 2019-08-01 | End: 2019-08-02 | Stop reason: HOSPADM

## 2019-08-01 RX ORDER — METHYLPREDNISOLONE SODIUM SUCCINATE 125 MG/2ML
125 INJECTION, POWDER, LYOPHILIZED, FOR SOLUTION INTRAMUSCULAR; INTRAVENOUS ONCE
Status: COMPLETED | OUTPATIENT
Start: 2019-08-01 | End: 2019-08-01

## 2019-08-01 RX ORDER — SODIUM CHLORIDE 0.9 % (FLUSH) 0.9 %
10 SYRINGE (ML) INJECTION EVERY 12 HOURS SCHEDULED
Status: DISCONTINUED | OUTPATIENT
Start: 2019-08-01 | End: 2019-08-02 | Stop reason: HOSPADM

## 2019-08-01 RX ORDER — DIPHENHYDRAMINE HYDROCHLORIDE 50 MG/ML
50 INJECTION INTRAMUSCULAR; INTRAVENOUS ONCE
Status: COMPLETED | OUTPATIENT
Start: 2019-08-01 | End: 2019-08-01

## 2019-08-01 RX ORDER — SODIUM CHLORIDE 9 MG/ML
INJECTION, SOLUTION INTRAVENOUS CONTINUOUS
Status: DISCONTINUED | OUTPATIENT
Start: 2019-08-01 | End: 2019-08-02 | Stop reason: HOSPADM

## 2019-08-01 RX ADMIN — DIPHENHYDRAMINE HYDROCHLORIDE 50 MG: 50 INJECTION INTRAMUSCULAR; INTRAVENOUS at 08:46

## 2019-08-01 RX ADMIN — SODIUM CHLORIDE: 9 INJECTION, SOLUTION INTRAVENOUS at 08:35

## 2019-08-01 RX ADMIN — METHYLPREDNISOLONE SODIUM SUCCINATE 125 MG: 125 INJECTION, POWDER, LYOPHILIZED, FOR SOLUTION INTRAMUSCULAR; INTRAVENOUS at 08:45

## 2019-08-01 NOTE — H&P
[facility]  Pre- Procedure History and Physical/Update          Patient Name:  Saturnino Beltrán  MRN:    [id number]  YOB: 1950  Date of evaluation:  8/1/2019       Please refer to the consult note / H&P completed by Dr. Limmie Lesch on 7/5 in the medical record and note that:       [x] I have examined the patient and reviewed the H&P/Consult and there are no changes to be made to the assessment or plan. Intermittent chest tightness. 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 Past Medical History:   Diagnosis Date    Anxiety     remote, not ongoing    Asthma 1990    Cough     Deep vein thrombosis (DVT) of right lower extremity ([facility] 1997    Depression     remote, not ongoing    Early stage skin cancer 2012    excised by dermatology then.  Environmental allergies     Factor V deficiency ([facility]     Hx of factor V Leiden mutation 10/20/2016    Hypercholesteremia     Hypothyroidism     Skin cancer     skin    SOB (shortness of breath)     Wheezing        Past Surgical History:   Procedure Laterality Date    CARDIAC CATHETERIZATION  08/01/2019    COLONOSCOPY  07/24/2018    COLONOSCOPY  7/24/2018    COLONOSCOPY POLYPECTOMY HOT BIOPSY performed by Munira Baker MD at [facility]    ENDOSCOPY, COLON, DIAGNOSTIC  07/24/2018    [place] [facility] FLX WITH DIRECTED SUBMUCOSAL Benjy Anthony Ty [address] ANY SBST  7/24/2018    COLONOSCOPY SUBMUCOSAL/BOTOX INJECTION performed by Munira Baker MD at [address] [place] [address] N/A 7/24/2018    EGD DIAGNOSTIC ONLY performed by Munira Baker MD at [address] [place] [address]        reports that he quit smoking about 33 years ago. His smoking use included cigarettes. He started smoking about 51 years ago. He has a 46.00 pack-year smoking history. He has never used smokeless tobacco. He reports that he drinks alcohol.

## 2019-08-01 NOTE — OP NOTE
[facility]    CARDIAC CATHETERIZATION    Date:   8/1/2019  Patient name: Lindsey Dukes  Date of admission:  8/1/2019  7:49 AM  MRN:   [id number]  YOB: 1950    Operators:  Primary:  Dr Luisito He   CV Fellow: Dr Dori Rcok    Pre Procedure Conscious Sedation Data:    ASA Class:    [] I [x] II [] III [] IV    Mallampati Class:  [] I [x] II [] III [] IV     Indication:  [] STEMI      [x] + Stress test  [] ACS      [] + EKG Changes  [] Non Q MI       [x] Significant Risk Factors  [x] Recurrent Angina             [] Diabetes Mellitus    [] New LBBB      [] Uncontrolled HTN. [] CHF / Low EF changes     [] Abnormal CTA / Ca Score    Procedure:  Access:  [] Femoral  [x] Radial  artery       [x] Right  [] Left    Procedure: After informed consent was obtained with explanation of the risks and benefits, patient was brought to the cath lab. The access area was prepped and draped in sterile fashion. 1% lidocaine was used for local block. The artery was cannulated with 6  Fr sheath with brisk arterial blood return. The side port was frequently flushed and aspirated with normal saline. Findings:  Left main: Normal 0% stenosis  LAD: Normal 0% stenosis  LCX: Normal 0% stenosis  RCA: Normal 0% stenosis  The LV gram was performed in the CABRERA 30 position. LVEF: 60%. LV Wall Motion: Normal    Conclusions:  1. Normal coronaries  2. Normal  LV function    Recommendation:  1. Risk factor modifications        History and Risk Factors    [] Hypertension     [] Family history of CAD  [x] Hyperlipidemia     [] Cerebrovascular Disease   [] Prior MI       [] Peripheral Vascular disease   [] Prior PCI              [] Diabetes Mellitus    [] Left Main PCI. [] Currently on Dialysis. [] Prior CABG. [] Currently smoker. [] Cardiac Arrest outside of healthcare facility.  [] Yes    [x] No        Witnessed     [] Yes   [] No     Arrest after arrival of EMS  [] Yes   [] No     [] Cardiac Arrest at other

## 2020-05-08 RX ORDER — MONTELUKAST SODIUM 10 MG/1
10 TABLET ORAL NIGHTLY
Qty: 30 TABLET | Refills: 2 | Status: SHIPPED | OUTPATIENT
Start: 2020-05-08 | End: 2020-08-19

## 2020-06-11 NOTE — TELEPHONE ENCOUNTER
A request is coming over from pharmacy for [facility] inhaler.  Pt is current please sign the attached script

## 2020-07-24 ENCOUNTER — OFFICE VISIT (OUTPATIENT)
Dept: PULMONOLOGY | Age: 70
End: 2020-07-24
Payer: MEDICARE

## 2020-07-24 VITALS
BODY MASS INDEX: 22.53 KG/M2 | RESPIRATION RATE: 14 BRPM | WEIGHT: 185 LBS | HEART RATE: 60 BPM | HEIGHT: 76 IN | SYSTOLIC BLOOD PRESSURE: 136 MMHG | TEMPERATURE: 97.9 F | OXYGEN SATURATION: 98 % | DIASTOLIC BLOOD PRESSURE: 76 MMHG

## 2020-07-24 PROCEDURE — 1036F TOBACCO NON-USER: CPT | Performed by: INTERNAL MEDICINE

## 2020-07-24 PROCEDURE — G8427 DOCREV CUR MEDS BY ELIG CLIN: HCPCS | Performed by: INTERNAL MEDICINE

## 2020-07-24 PROCEDURE — G8420 CALC BMI NORM PARAMETERS: HCPCS | Performed by: INTERNAL MEDICINE

## 2020-07-24 PROCEDURE — 99213 OFFICE O/P EST LOW 20 MIN: CPT | Performed by: INTERNAL MEDICINE

## 2020-07-24 PROCEDURE — 1123F ACP DISCUSS/DSCN MKR DOCD: CPT | Performed by: INTERNAL MEDICINE

## 2020-07-24 PROCEDURE — 4040F PNEUMOC VAC/ADMIN/RCVD: CPT | Performed by: INTERNAL MEDICINE

## 2020-07-24 PROCEDURE — 3017F COLORECTAL CA SCREEN DOC REV: CPT | Performed by: INTERNAL MEDICINE

## 2020-07-24 ASSESSMENT — ENCOUNTER SYMPTOMS
EYES NEGATIVE: 1
RESPIRATORY NEGATIVE: 1

## 2020-07-24 NOTE — PROGRESS NOTES
Subjective:      Patient ID: Hansa Corado is a 79 y.o. male. HPI  Follow-up visit for asthma and postnasal drip syndrome. Since his last visit a year ago he has been faithfully using nasal saline irrigations. Reports that it is greatly effective. Uses Singulair 10 mg nightly. Rarely uses albuterol. No exercise-induced or nocturnal symptoms. Asthma well-controlled. No symptoms of COVID-19 infection. Developed an inguinal hernia. Scheduled for surgery in 3 weeks. Review of Systems   Constitutional: Negative. HENT: Negative. Eyes: Negative. Respiratory: Negative. Cardiovascular: Negative. Genitourinary: Positive for scrotal swelling. All other systems reviewed and are negative. Objective:     Physical Exam  Vitals signs and nursing note reviewed. Constitutional:       Appearance: He is well-developed. Eyes:      General: No scleral icterus. Conjunctiva/sclera: Conjunctivae normal.   Neck:      Musculoskeletal: Neck supple. Thyroid: No thyromegaly. Vascular: No JVD. Trachea: No tracheal deviation. Cardiovascular:      Rate and Rhythm: Normal rate and regular rhythm. Heart sounds: Normal heart sounds. No murmur. No gallop. Pulmonary:      Effort: Pulmonary effort is normal. No respiratory distress. Breath sounds: No wheezing or rales. Chest:      Chest wall: No tenderness. Abdominal:      Palpations: Abdomen is soft. Tenderness: There is no abdominal tenderness. Lymphadenopathy:      Cervical: No cervical adenopathy. Skin:     General: Skin is warm and dry. Neurological:      Mental Status: He is alert and oriented to person, place, and time.          Wt Readings from Last 3 Encounters:   07/24/20 185 lb (83.9 kg)   08/01/19 185 lb (83.9 kg)   06/17/19 188 lb (85.3 kg)          Results for orders placed or performed during the hospital encounter of 08/01/19   Platelet count   Result Value Ref Range    Platelets 969 818 - 230 k/uL Hemoglobin and hematocrit, blood   Result Value Ref Range    POC Hemoglobin 15.9 13.5 - 17.5 g/dL    POC Hematocrit 47 41 - 53 %   SODIUM (POC)   Result Value Ref Range    POC Sodium 144 138 - 146 mmol/L   POTASSIUM (POC)   Result Value Ref Range    POC Potassium 3.9 3.5 - 4.5 mmol/L   CHLORIDE (POC)   Result Value Ref Range    POC Chloride 106 98 - 107 mmol/L   Creatinine W/GFR Point of Care   Result Value Ref Range    POC Creatinine 1.13 0.51 - 1.19 mg/dL    GFR Comment >60 >60 mL/min    GFR Non-African American >60 >60 mL/min    GFR Comment         POCT Glucose   Result Value Ref Range    POC Glucose 94 74 - 100 mg/dL       Assessment:         1. Mild persistent asthma without complication          Plan:      1. Patient cleared for surgery without excessive pulmonary risk. Continue bronchodilators per routine. 2. Discussed strategies to mitigate risk of COVID-19 infection. 3. Flu shot in fall. 4. Return in 1 year.    Electronically signed by Aminah Heath DO on 7/24/2020at 1:21 PM

## 2020-08-19 RX ORDER — MONTELUKAST SODIUM 10 MG/1
TABLET ORAL
Qty: 90 TABLET | Refills: 3 | Status: SHIPPED | OUTPATIENT
Start: 2020-08-19 | End: 2021-07-30 | Stop reason: SDUPTHER

## 2021-01-25 RX ORDER — VALACYCLOVIR HYDROCHLORIDE 500 MG/1
TABLET, FILM COATED ORAL
Qty: 10 TABLET | OUTPATIENT
Start: 2021-01-25

## 2021-02-01 ENCOUNTER — HOSPITAL ENCOUNTER (OUTPATIENT)
Age: 71
Setting detail: SPECIMEN
Discharge: HOME | End: 2021-02-01
Payer: MEDICARE

## 2021-02-01 LAB
ALBUMIN SERPL-MCNC: 3.9 G/DL (ref 3.5–5.2)
ALBUMIN/GLOBULIN RATIO: 1.4 (ref 1–2.5)
ALP BLD-CCNC: 36 U/L (ref 40–129)
ALT SERPL-CCNC: 14 U/L (ref 5–41)
ANION GAP SERPL CALCULATED.3IONS-SCNC: 12 MMOL/L (ref 9–17)
AST SERPL-CCNC: 25 U/L
BILIRUB SERPL-MCNC: 0.54 MG/DL (ref 0.3–1.2)
BUN BLDV-MCNC: 18 MG/DL (ref 8–23)
BUN/CREAT BLD: ABNORMAL (ref 9–20)
C-REACTIVE PROTEIN: <3 MG/L (ref 0–5)
CALCIUM SERPL-MCNC: 9.2 MG/DL (ref 8.6–10.4)
CHLORIDE BLD-SCNC: 109 MMOL/L (ref 98–107)
CHOLESTEROL/HDL RATIO: 4.4
CHOLESTEROL: 191 MG/DL
CO2: 22 MMOL/L (ref 20–31)
CREAT SERPL-MCNC: 1.3 MG/DL (ref 0.7–1.2)
GFR AFRICAN AMERICAN: >60 ML/MIN
GFR NON-AFRICAN AMERICAN: 55 ML/MIN
GFR SERPL CREATININE-BSD FRML MDRD: ABNORMAL ML/MIN/{1.73_M2}
GFR SERPL CREATININE-BSD FRML MDRD: ABNORMAL ML/MIN/{1.73_M2}
GLUCOSE BLD-MCNC: 94 MG/DL (ref 70–99)
HDLC SERPL-MCNC: 43 MG/DL
LDL CHOLESTEROL: 133 MG/DL (ref 0–130)
POTASSIUM SERPL-SCNC: 4.2 MMOL/L (ref 3.7–5.3)
PROSTATE SPECIFIC ANTIGEN: 3.07 UG/L
SODIUM BLD-SCNC: 143 MMOL/L (ref 135–144)
THYROXINE, FREE: 1.34 NG/DL (ref 0.93–1.7)
TOTAL PROTEIN: 6.7 G/DL (ref 6.4–8.3)
TRIGL SERPL-MCNC: 75 MG/DL
TSH SERPL DL<=0.05 MIU/L-ACNC: 5.39 MIU/L (ref 0.3–5)
VLDLC SERPL CALC-MCNC: ABNORMAL MG/DL (ref 1–30)

## 2021-02-02 LAB — VITAMIN D 25-HYDROXY: 59.1 NG/ML (ref 30–100)

## 2021-02-02 RX ORDER — VALACYCLOVIR HYDROCHLORIDE 500 MG/1
TABLET, FILM COATED ORAL
Qty: 10 TABLET | OUTPATIENT
Start: 2021-02-02

## 2021-06-16 DIAGNOSIS — J45.30 MILD PERSISTENT ASTHMA WITHOUT COMPLICATION: ICD-10-CM

## 2021-06-17 RX ORDER — ALBUTEROL SULFATE 90 UG/1
AEROSOL, METERED RESPIRATORY (INHALATION)
Qty: 1 INHALER | Refills: 1 | Status: SHIPPED | OUTPATIENT
Start: 2021-06-17 | End: 2021-07-30 | Stop reason: SDUPTHER

## 2021-06-17 NOTE — TELEPHONE ENCOUNTER
Dr Joann Castro, patient is current and scheduled for follow up on 7/30/21. Per last dictation patient is on albuterol. Please sign for refill if ok. Thank you.

## 2021-07-30 ENCOUNTER — OFFICE VISIT (OUTPATIENT)
Dept: PULMONOLOGY | Age: 71
End: 2021-07-30
Payer: MEDICARE

## 2021-07-30 VITALS
BODY MASS INDEX: 22.77 KG/M2 | TEMPERATURE: 98.2 F | HEIGHT: 76 IN | DIASTOLIC BLOOD PRESSURE: 69 MMHG | RESPIRATION RATE: 14 BRPM | WEIGHT: 187 LBS | OXYGEN SATURATION: 99 % | HEART RATE: 62 BPM | SYSTOLIC BLOOD PRESSURE: 107 MMHG

## 2021-07-30 DIAGNOSIS — J45.30 MILD PERSISTENT ASTHMA WITHOUT COMPLICATION: ICD-10-CM

## 2021-07-30 PROCEDURE — 3017F COLORECTAL CA SCREEN DOC REV: CPT | Performed by: INTERNAL MEDICINE

## 2021-07-30 PROCEDURE — 1036F TOBACCO NON-USER: CPT | Performed by: INTERNAL MEDICINE

## 2021-07-30 PROCEDURE — 4040F PNEUMOC VAC/ADMIN/RCVD: CPT | Performed by: INTERNAL MEDICINE

## 2021-07-30 PROCEDURE — G8420 CALC BMI NORM PARAMETERS: HCPCS | Performed by: INTERNAL MEDICINE

## 2021-07-30 PROCEDURE — 1123F ACP DISCUSS/DSCN MKR DOCD: CPT | Performed by: INTERNAL MEDICINE

## 2021-07-30 PROCEDURE — 99213 OFFICE O/P EST LOW 20 MIN: CPT | Performed by: INTERNAL MEDICINE

## 2021-07-30 PROCEDURE — G8427 DOCREV CUR MEDS BY ELIG CLIN: HCPCS | Performed by: INTERNAL MEDICINE

## 2021-07-30 RX ORDER — ALBUTEROL SULFATE 90 UG/1
2 AEROSOL, METERED RESPIRATORY (INHALATION) EVERY 6 HOURS PRN
Qty: 1 INHALER | Refills: 11 | Status: SHIPPED | OUTPATIENT
Start: 2021-07-30 | End: 2022-07-29 | Stop reason: SDUPTHER

## 2021-07-30 RX ORDER — MONTELUKAST SODIUM 10 MG/1
10 TABLET ORAL NIGHTLY
Qty: 90 TABLET | Refills: 3 | Status: SHIPPED | OUTPATIENT
Start: 2021-07-30 | End: 2022-07-29 | Stop reason: SDUPTHER

## 2021-07-30 ASSESSMENT — ENCOUNTER SYMPTOMS
RESPIRATORY NEGATIVE: 1
EYES NEGATIVE: 1

## 2021-07-30 NOTE — PROGRESS NOTES
Subjective:      Patient ID: Tino Rao is a 70 y.o. male being seen in my clinic for   Chief Complaint   Patient presents with    Asthma     follow up       HPI  Follow-up visit for asthma. Since his last visit a year ago he has been generally asymptomatic. Uses montelukast 10 mg nightly and rarely albuterol. Also he has been doing the nasal saline rinses which he states helps considerably. No new health problems. Received his Covid vaccinations. Denies disease. Review of Systems   Constitutional: Negative. HENT: Negative. Eyes: Negative. Respiratory: Negative. Cardiovascular: Negative. All other systems reviewed and are negative. Objective:     Vitals:    07/30/21 1320   BP: 107/69   Site: Left Upper Arm   Position: Sitting   Pulse: 62   Resp: 14   Temp: 98.2 °F (36.8 °C)   TempSrc: Temporal   SpO2: 99%   Weight: 187 lb (84.8 kg)   Height: 6' 4\" (1.93 m)     Current Outpatient Medications   Medication Sig Dispense Refill    albuterol sulfate  (90 Base) MCG/ACT inhaler Inhale 2 puffs into the lungs every 6 hours as needed for Wheezing 1 Inhaler 11    montelukast (SINGULAIR) 10 MG tablet Take 1 tablet by mouth nightly 90 tablet 3    levothyroxine (SYNTHROID) 50 MCG tablet take 1 tablet by mouth once daily 30 tablet 5    aspirin EC 81 MG EC tablet Take 81 mg by mouth every morning (before breakfast)       No current facility-administered medications for this visit. Physical Exam  HENT:      Mouth/Throat:      Mouth: Mucous membranes are moist.      Pharynx: Oropharynx is clear. No oropharyngeal exudate.        Wt Readings from Last 3 Encounters:   07/30/21 187 lb (84.8 kg)   07/24/20 185 lb (83.9 kg)   08/01/19 185 lb (83.9 kg)     Results for orders placed or performed during the hospital encounter of 02/01/21   Comprehensive Metabolic Panel   Result Value Ref Range    Glucose 94 70 - 99 mg/dL    BUN 18 8 - 23 mg/dL    CREATININE 1.30 (H) 0.70 - 1.20 mg/dL    Bun/Cre Ratio NOT REPORTED 9 - 20    Calcium 9.2 8.6 - 10.4 mg/dL    Sodium 143 135 - 144 mmol/L    Potassium 4.2 3.7 - 5.3 mmol/L    Chloride 109 (H) 98 - 107 mmol/L    CO2 22 20 - 31 mmol/L    Anion Gap 12 9 - 17 mmol/L    Alkaline Phosphatase 36 (L) 40 - 129 U/L    ALT 14 5 - 41 U/L    AST 25 <40 U/L    Total Bilirubin 0.54 0.3 - 1.2 mg/dL    Total Protein 6.7 6.4 - 8.3 g/dL    Albumin 3.9 3.5 - 5.2 g/dL    Albumin/Globulin Ratio 1.4 1.0 - 2.5    GFR Non-African American 55 (L) >60 mL/min    GFR African American >60 >60 mL/min    GFR Comment          GFR Staging NOT REPORTED    C-Reactive Protein   Result Value Ref Range    CRP <3.0 0.0 - 5.0 mg/L   T4, Free   Result Value Ref Range    Thyroxine, Free 1.34 0.93 - 1.70 ng/dL   Lipid Panel   Result Value Ref Range    Cholesterol 191 <200 mg/dL    HDL 43 >40 mg/dL    LDL Cholesterol 133 (H) 0 - 130 mg/dL    Chol/HDL Ratio 4.4 <5    Triglycerides 75 <150 mg/dL    VLDL NOT REPORTED 1 - 30 mg/dL   PSA, Diagnostic   Result Value Ref Range    PSA 3.07 <4.1 ug/L   TSH without Reflex   Result Value Ref Range    TSH 5.39 (H) 0.30 - 5.00 mIU/L   Vitamin D 25 Hydroxy   Result Value Ref Range    Vit D, 25-Hydroxy 59.1 30.0 - 100.0 ng/mL       :      1. Mild persistent asthma without complication      Patient Active Problem List   Diagnosis    Mild intermittent asthma without complication    Venous stasis ulcer of ankle (HCC)    Dry ear canal    Irritation of left eye    Hx of factor V Leiden mutation    Hypercholesteremia    Abnormal EKG    Acute pain of right knee    Acute conjunctivitis of both eyes    Positive FIT (fecal immunochemical test)    Acquired hypothyroidism    Perennial allergic rhinitis    Erectile dysfunction    Benign prostatic hyperplasia         Plan:      1. Refilled montelukast and albuterol. 2. Discussed strategies for management of asthma. 3. Flu shot in fall. 4. Return in 1 year.   Orders Placed This Encounter   Medications    albuterol sulfate  (90 Base) MCG/ACT inhaler     Sig: Inhale 2 puffs into the lungs every 6 hours as needed for Wheezing     Dispense:  1 Inhaler     Refill:  11    montelukast (SINGULAIR) 10 MG tablet     Sig: Take 1 tablet by mouth nightly     Dispense:  90 tablet     Refill:  3     No orders of the defined types were placed in this encounter. Return in about 1 year (around 7/30/2022).        Electronically signed by Fanny Mcdowell DO on 7/30/2021at 1:48 PM

## 2022-06-06 ENCOUNTER — HOSPITAL ENCOUNTER (OUTPATIENT)
Dept: PHYSICAL THERAPY | Facility: CLINIC | Age: 72
Setting detail: THERAPIES SERIES
Discharge: HOME | End: 2022-06-06
Payer: MEDICARE

## 2022-06-06 PROCEDURE — 97161 PT EVAL LOW COMPLEX 20 MIN: CPT

## 2022-06-06 PROCEDURE — 97110 THERAPEUTIC EXERCISES: CPT

## 2022-06-06 PROCEDURE — 97140 MANUAL THERAPY 1/> REGIONS: CPT

## 2022-06-06 NOTE — CARE COORDINATION
Medicare Cap   [] Physical Therapy  [] Speech Therapy  [] Occupational therapy  *PT and Speech caps combine      $2150 Limit for PT and Speech combined  $2150 Limit for OT individually  At the beginning of the month where you expect to go over $2150, please add the [address] [place] [address] modifier      Patient Name: Tino Rao  YOB: 1950    Note:  This is an estimate of charges billed.      Date of M[address] Name # units/ charge $$$ charge Daily Total Charge Ongoing Total $$$   6/6/22 IE, man, yash 1x1x1  142.19 142.19

## 2022-06-06 NOTE — FLOWSHEET NOTE
[facility] Fall Risk Assessment    Patient Name:  Annie Rutledge  : 1950    Risk Factor Scale  Score   History of Falls [] Yes  [] No 25  0 0   Secondary Diagnosis [] Yes  [] No 15  0 0   Ambulatory Aid [] Furniture  [] Crutches/cane/walker  [] None/bedrest/wheelchair/nurse 30  15  0 0   IV/Heparin Lock [] Yes  [] No 20  0 0   Gait/Transferring [] Impaired  [] Weak  [] Normal/bedrest/immobile 20  10  0 0   Mental Status [] Forgets limitations  [] Oriented to own ability 15  0 0      Total:0     Based on the Assessment score: check the appropriate box.     [x]  No intervention needed   Low =   Score of 0-24    []  Use standard prevention interventions Moderate =  Score of 24-44   [] Give patient handout and discuss fall prevention strategies   [] Establish goal of education for patient/family RE: fall prevention strategies    []  Use high risk prevention interventions High = Score of 45 and higher   [] Give patient handout and discuss fall prevention strategies   [] Establish goal of education for patient/family Re: fall prevention strategies   [] Discuss lifeline / other resources    Electronically signed by:   Oneal Georges PT  Date: 2022

## 2022-06-06 NOTE — CONSULTS
[x] [address]   Outpatient Rehabilitation &  Therapy  [address] [facility]  P: [phone]  F: [phone] [] [email or web address]  P: [phone]  F: [phone] [] [address]  [facility]   [place]: [phone]  F: [phone]         Physical Therapy Running Evaluation    Date:  2022  Patient: Shayan Sher    : 1950  MRN: [id number]  Physician: Dr. Nagy Hazard: Medicare  Medical Diagnosis:  R achilles tendonitis  Rehab Codes:  M79.671  Onset date:  22     Next 's appt.: none    Subjective:   CC: R Achilles pain that limits his running  HPI: 4-5 wks ago after in [facility]. Took off fast for the first 2 min and no issues. He never warms up. Did not change shoes. No changes in training program.  Next am woke up sore. 1 wk later, he ran another 5k and was still sore. Took 2 wks off. Then ran 5k in 36 min with less pain. Tried compression, tennis ball to foot. No knee or hip issues, but requests a quick look at his hip mobility as his  says that his hip mobility is limited    PMHx: [] Unremarkable [] Diabetes [] HTN   [] Pacemaker   [] MI/Heart Problems [] Cancer [] Arthritis  [x] Other: he does get swelling as he had a blood clot in  and has had edema since then.                [x] Refer to full medical chart  In EPIC     Tests: [] X-Ray: [] MRI:  [] none:     Medications: [x] Refer to full medical record [] None [] Other:  Allergies:      [x] Refer to full medical record [] None [] Other:    Working:  [x] Normal Duty  [] Light Duty  [] Off D/T Condition  [] Retired    [] Not Employed    []  Disability  [] Other:           Return to work:     Job/ADL Description: sales  Next goal race:     Pain:  [x] Yes  [] No   Location:   Pain Rating: (0-10 scale)   1. R achilles  1/10      Pain altered Tx:  [] Yes  [x] No Action:  Symptoms:  [x] Improving [] Worsening [] Same  Better:  [] Meds    [] Ice pack    [] Sit    [] Not running  []Stand    [] Walk    [] Stretching   [] Other:  Worse: [x] Run    [] Easy    [] Speed work    []Stand    [] Walk    [] Stairs    [] Sit    [] Other:  Sleep: [x] OK    [] Disturbed    Objective:    ROM  ° A/P STRENGTH TESTS (+/-) Left Right Not Tested    Left Right Left Right Ant. Drawer   []   Hip Flex  wnl   Post. Drawer   []   Ext 10 10  3+ Lachmans   []   ER stiff stiff   Valgus Stress   []   IR stiff stiff   Varus Stress   []   ABD stiff stiff   Codeys   []   ADD wnl wnl   Pat-Fem Grind   []   Knee Flex  wnl   FADIRs   []   Ext  wnl   Hip Scouring   []   Ankle DF stiff stiff  5 CHELSEAs   []   PF  wnl  5 Piriformis   []   INV    5 Felecias   []   EVER    5 David     []   GTE     Jameson's   []       OBSERVATION No Deficit Deficit Not Tested Comments   Posture       Forward Head [] [] []    Rounded Shoulders [] [] []    Kyphosis [] [] []    Lordosis [] [] []    Scoliosis [] [] []    Iliac Crest [] [] []    PSIS [] [] []    ASIS [] [] []    Genu Valgus [] [] []    Genu Varus [] [] []    Genu Recurvatum [] [] []    Pronation [] [] []    Supination [] [] []    Leg Length Discrp [] [] []    Slumped Sitting [] [] []    Palpation [] [x] [] R nodule on midsubstance of Achilles, neg at insertion, MTJ, and muscle belly. Sensation [] [] []    Edema [] [x] []    Neurological [] [] []    Patellar Mobility [] [] []    Patellar Orientation [] [] []    Gait [] [] [] Analysis: deferred         Functional Test: LEFI Score: 2% functionally impaired     Comments:  Assessment:Patient would benefit from skilled physical therapy services in order to: decrease swelling and pain on R Achilles midsubstance of tendon and improve overall hip mobility so that he can return to running and golf   Problems:    [x] ? Pain:     [x] ? ROM:    [x] ? Strength:    [x] ? Function: Not able to run as he wishes   [] ?  Balance  [x] Increased edema:  [] Postural Deviations  [x] Gait Deviations TBD  [x]  LEFI score is 76/80  [] Other:      STG: (to be met in 5 treatments)  1. ? Pain: <3/10 at all times so that he can continue to run.  2. ? ROM: with DF and all hip movements  3. ? Strength: eccentrically with calf and hip ext and abd  4. ? Function: able to race 5k with <3/10 pain  5. LEFI score to 80/80  6. Independent with Home Exercise Programs    LTG: (to be met in 10 treatments)  1. No pain in R achilles  2. MMT with >4/5   3. Able to run without R achilles pain                 Patient goals:\"be able to continue running\"    Rehab Potential:  [x] Good  [] Fair  [] Poor   Suggested Professional Referral:  [x] No  [] Yes:  Barriers to Goal Achievement[de-identified]  [x] No  [] Yes:  Domestic Concerns:  [x] No  [] Yes:    Pt. Education:  [x] Plans/Goals, Risks/Benefits discussed  [x] Home exercise program    Method of Education  [x] Verbal   To consider continuing with yoga at the end of PT   [x] Demo  [x] Written  Access Code: [email or web address]  URL: [email or web address]  Date: 06/06/2022  Prepared by: German Cintron    Exercises  Sidelying Hip Abduction - 2 x daily - 7 x weekly - 2 sets - 10 reps  hip adductor stretch with foot on step - 2 x daily - 7 x weekly - 1 sets - 3 reps - 30 sec hold  David Stretch on Table - 2 x daily - 7 x weekly - 1 sets - 3 reps - 30 sec hold  Gastroc Stretch on Wall - 2 x daily - 7 x weekly - 1 sets - 3 reps - 30 sec hold  Prone Quadriceps Stretch with Strap - 2 x daily - 7 x weekly - 1 sets - 3 reps - 30 sec hold  Standing Eccentric Heel Raise - 2 x daily - 7 x weekly - 3 sets - 10 reps    Comprehension of Education:  [] Verbalizes understanding. [] Demonstrates understanding. [x] Needs Review. [] Demonstrates/verbalizes understanding of HEP/Ed previously given.     Treatment Plan:  [x] Therapeutic Exercise    [x] Therapeutic Activity  [x] Manual Therapy   [x] Alter G treadmill  [x] Phys perf test     [x] Vasocompression/Game Ready   [x] Neuromuscular Re-education [x] Instruction in HEP     [x] Aquatic Therapy                           Frequency:  1x/week for 10 visits pt requests 1x/wk and Mondays work best for him    Todays Treatment:  Modalities: use Game Ready PRN  Precautions: standard  Exercises:  Exercise Reps/ Time Weight/ Level Issued for HEP  Comments   Prone        Flying squirrels        Hip ext (glut max)        [place] hip ext 30-50       Supine        2 legged bridges *       1 legged bridges        David stretch *    Hips to remain in neutral to ext   Sukh calf stretch *       Sidelying        Michelle hip abd *       Hip adductor stretch *       Burrito stretch *       Lunge walks *       Eccentric calf raises 15x  x x                                                                                                    Other:  Manual  1. STM in prone to R calf followed by IASTM to Garth buttock and calf     Specific Instructions for next treatment:  1. Perform manual to calf  3. Add hip stretching    Treatment Charges: Mins Units   [x] Evaluation       [x]  Low       []  Moderate       []  High  1   [] Phys perf test     [x]  Ther Exercise 8 1   [x]  Manual Therapy 20 1   []  Ther Activities     []  Aquatics     []  Vasocompression     []  NMR       TOTAL TREATMENT TIME: 61    Time in: 1682  Time Out:1206    Electronically signed by: Ray Morales PT        Physician Signature:________________________________Date:__________________  By signing above or cosigning this note, I have reviewed this plan of care and certify a need for medically necessary rehabilitation services.      *PLEASE SIGN ABOVE AND FAX BACK ALL PAGES*

## 2022-06-13 ENCOUNTER — HOSPITAL ENCOUNTER (OUTPATIENT)
Dept: PHYSICAL THERAPY | Facility: CLINIC | Age: 72
Setting detail: THERAPIES SERIES
Discharge: HOME | End: 2022-06-13
Payer: MEDICARE

## 2022-06-13 PROCEDURE — 97140 MANUAL THERAPY 1/> REGIONS: CPT

## 2022-06-13 PROCEDURE — 97110 THERAPEUTIC EXERCISES: CPT

## 2022-06-13 NOTE — CARE COORDINATION
Medicare Cap   [] Physical Therapy  [] Speech Therapy  [] Occupational therapy  *PT and Speech caps combine      $2150 Limit for PT and Speech combined  $2150 Limit for OT individually  At the beginning of the month where you expect to go over $2150, please add the [address] [id number] modifier      Patient Name: Josse Mendoza  YOB: 1950    Note:  This is an estimate of charges billed.      Date of [address] Name # units/ charge $$$ charge Daily Total Charge Ongoing Total $$$   6/6/22 IE, man, yash 1x1x1  142.19 142.19   6/13/22 yash 3, man1   96.23 238.42

## 2022-06-13 NOTE — FLOWSHEET NOTE
[x] [facility]    [address], [place]    Phone: [phone]    Fax:  [phone]       Physical Therapy Daily Treatment Note    Date:  2022  Patient Name:  Tessa Terry    :  1950  MRN: [id number]  Physician: Dr. Mcmillan Life: Medicare  Medical Diagnosis:   R achilles tendonitis  Rehab Codes:  V16.712  Onset date:    22                                   Next Dr's appt.: none  Visit# / total visits: 2/10   Cancels/No Shows: 0/0    Subjective:    Pain:  [x] Yes  [] No Location: R achilles  Pain Rating: (0-10 scale) 0/10 110 \"if I wiggle it\" 2-3/10 when I walk. Pain altered Tx:  [x] No  [] Yes  Action:  Comments: ran Wednesday indoors on an indoor track and was sore. Ran a 5k on Saturday at a slow pace and is not any worse. His 1st 10k will be a part of the [employer]. Objective:  Modalities: use Game Ready PRN  Precautions: standard  Exercises:  Exercise Reps/ Time Weight/ Level   Comments   Supine           2 legged bridges 2x10   X     1 legged bridges  not able   --     David stretch 3x30\"   X    Sukh calf stretch 3x30\"   X     Sidelying           hip abd 2x10   X     Gym       Hip adductor stretch 3x30\"   X     Lunge walks *         Eccentric calf raises 2x10   x     BAPS 3x10 L2 X                             Other:  Manual  1. STM in prone to R calf followed by IASTM to Garth buttock and calf      Specific Instructions for next treatment:  1. Perform manual to calf  2. Add hip stretching      Treatment Charges: Mins Units   []  Phys perf test     [x]  Ther Exercise 40 3   [x]  Manual Therapy 15 1   []  Ther Activities     []  NMR     []  Vasocompression     []  Other     Total Treatment time 55 4       Assessment: [x] Progressing toward goals. weak with PF and abd. Notes a lifelong tendency to stand with hip ER/abd.   Minimal tightness on R buttock but quite a bit of tenderness on R calf with hypervolt    [] No change. [] Other:    Goals:  STG: (to be met in 5 treatments)  1. ? Pain: <3/10 at all times so that he can continue to run.  2. ? ROM: with DF and all hip movements  3. ? Strength: eccentrically with calf and hip ext and abd  4. ? Function: able to race 5k with <3/10 pain  5. LEFI score to 80/80  6. Independent with Home Exercise Programs     LTG: (to be met in 10 treatments)  1. No pain in R achilles  2. MMT with >4/5   3. Able to run without R achilles pain                 Patient goals:\"be able to continue running\"    Pt. Education:  [x] Yes  [] No  [x] Reviewed Prior HEP/Ed   Method of Education  [x]? Verbal   To consider continuing with yoga at the end of PT   [x]? Demo  [x]? Written  Access Code: [email or web address]  URL: [email or web address]  Date: 06/15/2022  Prepared by: Jalen Damon    Exercises  Sidelying Hip Abduction - 2 x daily - 7 x weekly - 2 sets - 10 reps  Supine Bridge - 2 x daily - 7 x weekly - 2 sets - 15 reps  Calf stretch - 2 x daily - 7 x weekly - 1 sets - 3 reps - 30 sec hold  David Stretch on Table - 2 x daily - 7 x weekly - 1 sets - 3 reps - 30 sec hold  Prone Quadriceps Stretch with Strap - 2 x daily - 7 x weekly - 1 sets - 3 reps - 30 sec hold  hip adductor stretch with foot on step - 2 x daily - 7 x weekly - 1 sets - 3 reps - 30 sec hold  Standing Eccentric Heel Raise - 2 x daily - 7 x weekly - 3 sets - 10 reps  Mini Lunge - 2 x daily - 7 x weekly - 2 sets - 10 reps      Comprehension of Education:  [] Verbalizes understanding. [] Demonstrates understanding. [x] Needs review. [] Demonstrates/verbalizes HEP/Ed previously given. Plan: [x] Continue per plan of care.  1x/wk  He is on vacation and will return in 2 wks   [] Other:     Time In: 1000           Time Out: 1100  Electronically signed by:  Jalen Damon, PT

## 2022-06-27 ENCOUNTER — HOSPITAL ENCOUNTER (OUTPATIENT)
Dept: PHYSICAL THERAPY | Facility: CLINIC | Age: 72
Setting detail: THERAPIES SERIES
Discharge: HOME | End: 2022-06-27
Payer: MEDICARE

## 2022-06-27 PROCEDURE — 97110 THERAPEUTIC EXERCISES: CPT

## 2022-06-27 PROCEDURE — 97140 MANUAL THERAPY 1/> REGIONS: CPT

## 2022-06-27 NOTE — FLOWSHEET NOTE
[x] [facility]    [address], [place]    Phone: [phone]    Fax:  [phone]       Physical Therapy Daily Treatment Note    Date:  2022  Patient Name:  Casie Boyle    :  1950  MRN: [id number]  Physician: Dr. Washburn Diver: Medicare  Medical Diagnosis:   R achilles tendonitis  Rehab Codes:  C07.249  Onset date:    22                                   Next Dr's appt.: none  Visit# / total visits: 3/10   Cancels/No Shows: 0/0    Subjective:    Pain:  [x] Yes  [] No Location: R achilles  Pain Rating: (0-10 scale) 0/10 1/10 \"if I wiggle it\" 2-3/10 when I walk. Pain altered Tx:  [x] No  [] Yes  Action:  Comments: ran a 5k on [employer] course on Saturday and drove from [place] to [facility] yesterday. Just overall stiff. Objective:  Modalities: use Game Ready PRN  Precautions: standard  Exercises:  Exercise Reps/ Time Weight/ Level   Comments   Prone        Quad stretch 3x30\"  xx W/ strap   Supine           2 legged bridges 20   xx     1 legged bridges 10   xx radha   Davdi stretch 3x30\"   xx    Sukh calf stretch 3x30\"   xx     Sidelying           hip abd 2x10   xx     Gym       Hip adductor stretch 3x30\"   xx     Lunges in //bars 15   xx     Eccentric calf raises 2x10  10 lbs xx Added a 10 lb for 2nd set   BAPS 2x10  1x10 L2   L3 xx                             Other:  Manual  1. IASTM to calf with Hypervolt     Specific Instructions for next treatment:  1. Perform manual to calf  2. Add hip stretching      Treatment Charges: Mins Units   []  Phys perf test     [x]  Ther Exercise 40 3   [x]  Manual Therapy 15 1   []  Ther Activities     []  NMR     []  Vasocompression     []  Other     Total Treatment time 55 4       Assessment: [x] Progressing toward goals. SLS 35 sec on the R and was much less stable on the R vs the L.      [] No change. [] Other:    Goals:  STG: (to be met in 5 treatments)  1. ? Pain: <3/10 at all times so that he can continue to run.  2. ? ROM: with DF and all hip movements  3. ? Strength: eccentrically with calf and hip ext and abd  4. ? Function: able to race 5k with <3/10 pain  5. LEFI score to 80/80  6. Independent with Home Exercise Programs     LTG: (to be met in 10 treatments)  1. No pain in R achilles  2. MMT with >4/5   3. Able to run without R achilles pain                 Patient goals:\"be able to continue running\"    Pt. Education:  [x] Yes  [] No  [x] Reviewed Prior HEP/Ed   Method of Education  [x]? Verbal   To consider continuing with yoga at the end of PT   [x]? Demo  [x]? Written  Access Code: [email or web address]  URL: [email or web address]  Date: 06/15/2022  Prepared by: Sagrario Graves    Exercises  Sidelying Hip Abduction - 2 x daily - 7 x weekly - 2 sets - 10 reps  Supine Bridge - 2 x daily - 7 x weekly - 2 sets - 15 reps  Calf stretch - 2 x daily - 7 x weekly - 1 sets - 3 reps - 30 sec hold  David Stretch on Table - 2 x daily - 7 x weekly - 1 sets - 3 reps - 30 sec hold  Prone Quadriceps Stretch with Strap - 2 x daily - 7 x weekly - 1 sets - 3 reps - 30 sec hold  hip adductor stretch with foot on step - 2 x daily - 7 x weekly - 1 sets - 3 reps - 30 sec hold  Standing Eccentric Heel Raise - 2 x daily - 7 x weekly - 3 sets - 10 reps  Mini Lunge - 2 x daily - 7 x weekly - 2 sets - 10 reps      Comprehension of Education:  [] Verbalizes understanding. [] Demonstrates understanding. [x] Needs review. [] Demonstrates/verbalizes HEP/Ed previously given. Plan: [x] Continue per plan of care. 1x/wk  He doesn't know his schedule to make his next appt. He was given the option of 2 Mondays with Heavenly Mobley or Douglas Oro or Tuesday or Fridays with me. He will call once he reviews his schedule.     [] Other:     Time In: 2992  Time Out:1000     Electronically signed by:  Sagrario Graves, PT

## 2022-06-27 NOTE — CARE COORDINATION
Medicare Cap   [x] Physical Therapy  [] Speech Therapy  [] Occupational therapy  *PT and Speech caps combine      $2150 Limit for PT and Speech combined  $2150 Limit for OT individually  At the beginning of the month where you expect to go over $2150, please add the [address] [address] modifier      Patient Name: Adriana Loaiza  YOB: 1950    Note:  This is an estimate of charges billed.      Date of [address] Name # units/ charge $$$ charge Daily Total Charge Ongoing Total $$$   6/6/22 IE, man, yash 1x1x1  142.19 142.19   6/13/22 yash 3, man1   96.23 238.42   6/27/22 [place], man   93.91 332.33

## 2022-07-05 ENCOUNTER — HOSPITAL ENCOUNTER (OUTPATIENT)
Dept: PHYSICAL THERAPY | Facility: CLINIC | Age: 72
Setting detail: THERAPIES SERIES
Discharge: HOME | End: 2022-07-05
Payer: MEDICARE

## 2022-07-05 PROCEDURE — 97016 VASOPNEUMATIC DEVICE THERAPY: CPT

## 2022-07-05 PROCEDURE — 97110 THERAPEUTIC EXERCISES: CPT

## 2022-07-05 PROCEDURE — 97140 MANUAL THERAPY 1/> REGIONS: CPT

## 2022-07-05 NOTE — CARE COORDINATION
Medicare Cap   [x] Physical Therapy  [] Speech Therapy  [] Occupational therapy  *PT and Speech caps combine      $2150 Limit for PT and Speech combined  $2150 Limit for OT individually  At the beginning of the month where you expect to go over $2150, please add the [address] [address] modifier      Patient Name: Chuy Ledesma  YOB: 1950    Note:  This is an estimate of charges billed.      Date of [address] Name # units/ charge $$$ charge Daily Total Charge Ongoing Total $$$   6/6/22 IE, man, yash 1x1x1  142.19 142.19   6/13/22 yash 3, man1   96.23 238.42   6/27/22 [place], man   93.91 332.33   7/5/22 TE,man,vaso 2,1,1 25.10+19.62+18.34+7.91 70.97 403.30

## 2022-07-05 NOTE — FLOWSHEET NOTE
[x] [facility]    [address], [place]    Phone: [phone]    Fax:  [phone]       Physical Therapy Daily Treatment Note    Date:  2022  Patient Name:  Elida Banda    :  1950  MRN: [id number]  Physician: Dr. Juan Luis Morgan: Medicare  Medical Diagnosis:   R achilles tendonitis  Rehab Codes:  V84.634  Onset date:    22                                   Next Dr's appt.: none  Visit# / total visits: 4/10   Cancels/No Shows: 0/0    Subjective:    Pain:  [x] Yes  [] No Location: R achilles  Pain Rating: (0-10 scale) 5/10  Comments: pt arrived with noticeable limp in R LE. States he ran 5K yesterday and now has increased stiffness, and tenderness on R achilles. States pain was 7/10 yesterday and is down to a 5/10 today. Objective:  Modalities: use Game Ready PRN  Precautions: standard  Exercises:  Exercise Reps/ Time Weight/ Level   Comments   SB gastroc stretch 3x30\"  xx    SB soleus stretch 3x30\"  xx    Stool HS stretch 3x30\"  xx           Prone        Quad stretch 3x30\"  xx W/ strap   Supine           2 legged bridges 20        1 legged bridges 10    radha   David stretch 3x30\"   xx    Sukh calf stretch 3x30\"   xx     Inversion/eversion stretch 3x30\"  xx    Sidelying           hip abd 2x10        Gym       Hip adductor stretch 3x30\"        Lunges in //bars 15        Eccentric calf raises 2x10  10 lbs  Added a 10 lb for 2nd set   BAPS 3x10 L1 xx                             Other:  Manual  1. IASTM to calf with Hypervolt     Specific Instructions for next treatment:  1. Perform manual to calf  2. Add hip stretching      Treatment Charges: Mins Units   []  Phys perf test     [x]  Ther Exercise 25 2   [x]  Manual Therapy 15 1   []  Ther Activities     []  NMR     [x]  Vasocompression 15 1   []  Other     Total Treatment time 55 4       Assessment: [x] Progressing toward goals. held weight bearing and strengthening due to high pain as well as decreased mobility. Focused on stretching, manual, and pain management with good tolerance. Pt noting decreased tightness and restrictions post stretching and manual. Provided new stretches for pt to perform at home with good understanding noted. Ended with vaso for decreased pain and soreness. [] No change. [] Other:    Goals:  STG: (to be met in 5 treatments)  1. ? Pain: <3/10 at all times so that he can continue to run.  2. ? ROM: with DF and all hip movements  3. ? Strength: eccentrically with calf and hip ext and abd  4. ? Function: able to race 5k with <3/10 pain  5. LEFI score to 80/80  6. Independent with Home Exercise Programs     LTG: (to be met in 10 treatments)  1. No pain in R achilles  2. MMT with >4/5   3. Able to run without R achilles pain                 Patient goals:\"be able to continue running\"    Pt. Education:  [x] Yes  [] No  [x] Reviewed Prior HEP/Ed   Method of Education  [x]? Verbal   To consider continuing with yoga at the end of PT   [x]? Demo  [x]? Written  Access Code: [email or web address]  URL: [email or web address]  Date: 06/15/2022  Prepared by: Alana Jaramillo    Exercises  Sidelying Hip Abduction - 2 x daily - 7 x weekly - 2 sets - 10 reps  Supine Bridge - 2 x daily - 7 x weekly - 2 sets - 15 reps  Calf stretch - 2 x daily - 7 x weekly - 1 sets - 3 reps - 30 sec hold  David Stretch on Table - 2 x daily - 7 x weekly - 1 sets - 3 reps - 30 sec hold  Prone Quadriceps Stretch with Strap - 2 x daily - 7 x weekly - 1 sets - 3 reps - 30 sec hold  hip adductor stretch with foot on step - 2 x daily - 7 x weekly - 1 sets - 3 reps - 30 sec hold  Standing Eccentric Heel Raise - 2 x daily - 7 x weekly - 3 sets - 10 reps  Mini Lunge - 2 x daily - 7 x weekly - 2 sets - 10 reps      Comprehension of Education:  [] Verbalizes understanding. [] Demonstrates understanding. [x] Needs review.   [] Demonstrates/verbalizes HEP/Ed previously given.     Plan: [x] Continue per plan of care. 1x/wk  He doesn't know his schedule to make his next appt. He was given the option of 2 Mondays with Damon Luna or Stevo 1827 or Tuesday or Fridays with me. He will call once he reviews his schedule.     [] Other:     Time In: 2:00 pm  Time Out: 3:00 pm     Electronically signed by:  Carlos Rascon PTA

## 2022-07-11 ENCOUNTER — HOSPITAL ENCOUNTER (OUTPATIENT)
Dept: PHYSICAL THERAPY | Facility: CLINIC | Age: 72
Setting detail: THERAPIES SERIES
Discharge: HOME | End: 2022-07-11
Payer: MEDICARE

## 2022-07-11 PROCEDURE — 97140 MANUAL THERAPY 1/> REGIONS: CPT

## 2022-07-11 PROCEDURE — 97110 THERAPEUTIC EXERCISES: CPT

## 2022-07-11 PROCEDURE — 97016 VASOPNEUMATIC DEVICE THERAPY: CPT

## 2022-07-11 NOTE — FLOWSHEET NOTE
[x] [facility]    [address], [place]    Phone: [phone]    Fax:  [phone]       Physical Therapy Daily Treatment Note    Date:  2022  Patient Name:  Skyler Reich    :  1950  MRN: [id number]  Physician: Dr. Quispe Mole: Medicare  Medical Diagnosis:   R achilles tendonitis  Rehab Codes:  W66.539  Onset date:    22                                   Next Dr's appt.: none  Visit# / total visits: 5/10   Cancels/No Shows: 0/0    Subjective: Ran this weekend with less pain as previous races. Does amb with limp still. Pain:  [x] Yes  [] No Location: R achilles  Pain Rating: (0-10 scale) 3-4/10  Comments:       Objective:  Modalities: use Game Ready PRN  Precautions: standard  Exercises:  Exercise Reps/ Time Weight/ Level   Comments   SB gastroc stretch 3x30\"  xx    SB soleus stretch 3x30\"  xx    Stool HS stretch 3x30\"  xx           Prone        Quad stretch 3x30\"  xx W/ strap   Supine           2 legged bridges 20        1 legged bridges 10    radha   David stretch 3x30\"       Sukh calf stretch 3x30\"   xx     Inversion/eversion stretch 3x30\"  xx    Sidelying           hip abd 2x10        Gym       Hip adductor stretch 3x30\"        Lunges in //bars 15   xx     Eccentric calf raises 2x10  10 lbs  Added a 10 lb for 2nd set   BAPS 3x10 L1 xx                             Other:  Manual  1. IASTM to calf with Hypervolt, STR R calf      Specific Instructions for next treatment:  1. Perform manual to calf  2. Add hip stretching      Treatment Charges: Mins Units   []  Phys perf test     [x]  Ther Exercise 28 2   [x]  Manual Therapy 12 1   []  Ther Activities     []  NMR     [x]  Vasocompression 15 1   []  Other     Total Treatment time 55 4       Assessment: [x] Progressing toward goals. Continued with stretches followed by manual interventions.  Utilized soft tissue release as well as use of hypervolt. Able to resume standing lunges, but does need UE support to complete. Attempted to increase to L2 on BAPS but unable to complete full revolution. Remains tender throughout R calf. Vaso applied post ex. [] No change. [] Other:    Goals:  STG: (to be met in 5 treatments)  1. ? Pain: <3/10 at all times so that he can continue to run.  2. ? ROM: with DF and all hip movements  3. ? Strength: eccentrically with calf and hip ext and abd  4. ? Function: able to race 5k with <3/10 pain  5. LEFI score to 80/80  6. Independent with Home Exercise Programs     LTG: (to be met in 10 treatments)  1. No pain in R achilles  2. MMT with >4/5   3. Able to run without R achilles pain                 Patient goals:\"be able to continue running\"    Pt. Education:  [x] Yes  [] No  [x] Reviewed Prior HEP/Ed   Method of Education  [x]? Verbal   To consider continuing with yoga at the end of PT   [x]? Demo  [x]? Written  Access Code: [email or web address]  URL: [email or web address]  Date: 06/15/2022  Prepared by: Nunu Ashby    Exercises  Sidelying Hip Abduction - 2 x daily - 7 x weekly - 2 sets - 10 reps  Supine Bridge - 2 x daily - 7 x weekly - 2 sets - 15 reps  Calf stretch - 2 x daily - 7 x weekly - 1 sets - 3 reps - 30 sec hold  David Stretch on Table - 2 x daily - 7 x weekly - 1 sets - 3 reps - 30 sec hold  Prone Quadriceps Stretch with Strap - 2 x daily - 7 x weekly - 1 sets - 3 reps - 30 sec hold  hip adductor stretch with foot on step - 2 x daily - 7 x weekly - 1 sets - 3 reps - 30 sec hold  Standing Eccentric Heel Raise - 2 x daily - 7 x weekly - 3 sets - 10 reps  Mini Lunge - 2 x daily - 7 x weekly - 2 sets - 10 reps      Comprehension of Education:  [] Verbalizes understanding. [] Demonstrates understanding. [x] Needs review. [] Demonstrates/verbalizes HEP/Ed previously given. Plan: [x] Continue per plan of care. 1x/wk  He doesn't know his schedule to make his next appt.   He was given the option of 2

## 2022-07-25 ENCOUNTER — HOSPITAL ENCOUNTER (OUTPATIENT)
Age: 72
Setting detail: SPECIMEN
Discharge: HOME | End: 2022-07-25

## 2022-07-25 LAB — PROSTATE SPECIFIC ANTIGEN: 3.96 NG/ML

## 2022-07-29 ENCOUNTER — OFFICE VISIT (OUTPATIENT)
Dept: PULMONOLOGY | Age: 72
End: 2022-07-29
Payer: MEDICARE

## 2022-07-29 VITALS
SYSTOLIC BLOOD PRESSURE: 133 MMHG | HEIGHT: 76 IN | OXYGEN SATURATION: 99 % | DIASTOLIC BLOOD PRESSURE: 81 MMHG | WEIGHT: 192 LBS | HEART RATE: 57 BPM | BODY MASS INDEX: 23.38 KG/M2

## 2022-07-29 DIAGNOSIS — J45.30 MILD PERSISTENT ASTHMA WITHOUT COMPLICATION: Primary | ICD-10-CM

## 2022-07-29 DIAGNOSIS — J45.990 EXERCISE-INDUCED ASTHMA: ICD-10-CM

## 2022-07-29 PROCEDURE — 99213 OFFICE O/P EST LOW 20 MIN: CPT | Performed by: INTERNAL MEDICINE

## 2022-07-29 PROCEDURE — G8427 DOCREV CUR MEDS BY ELIG CLIN: HCPCS | Performed by: INTERNAL MEDICINE

## 2022-07-29 PROCEDURE — 1036F TOBACCO NON-USER: CPT | Performed by: INTERNAL MEDICINE

## 2022-07-29 PROCEDURE — G8420 CALC BMI NORM PARAMETERS: HCPCS | Performed by: INTERNAL MEDICINE

## 2022-07-29 PROCEDURE — 1123F ACP DISCUSS/DSCN MKR DOCD: CPT | Performed by: INTERNAL MEDICINE

## 2022-07-29 PROCEDURE — 3017F COLORECTAL CA SCREEN DOC REV: CPT | Performed by: INTERNAL MEDICINE

## 2022-07-29 RX ORDER — MONTELUKAST SODIUM 10 MG/1
10 TABLET ORAL NIGHTLY
Qty: 90 TABLET | Refills: 3 | Status: SHIPPED | OUTPATIENT
Start: 2022-07-29

## 2022-07-29 RX ORDER — ALBUTEROL SULFATE 90 UG/1
2 AEROSOL, METERED RESPIRATORY (INHALATION) EVERY 6 HOURS PRN
Qty: 1 EACH | Refills: 11 | Status: SHIPPED | OUTPATIENT
Start: 2022-07-29

## 2022-07-29 ASSESSMENT — ENCOUNTER SYMPTOMS
RESPIRATORY NEGATIVE: 1
EYES NEGATIVE: 1

## 2022-07-30 NOTE — PROGRESS NOTES
Subjective:      Patient ID: Eryn Sanders is a 67 y.o. male being seen in my clinic for   Chief Complaint   Patient presents with    Follow-up     Yearly exam       HPI  Visit for mild asthma well-controlled on montelukast and as needed albuterol. Mostly exercise-induced trigger. Uses albuterol prior to his run. Denies any other exacerbations. Recently, because of Achilles tendinitis, he has been running less. Has not used albuterol. Remains on montelukast year-round. No new health problems. Up-to-date with his COVID vaccinations. Review of Systems   Constitutional: Negative. HENT: Negative. Eyes: Negative. Respiratory: Negative. Cardiovascular: Negative. All other systems reviewed and are negative. Objective:     Vitals:    07/29/22 1327   BP: 133/81   Pulse: 57   SpO2: 99%   Weight: 192 lb (87.1 kg)   Height: 6' 4\" (1.93 m)     Current Outpatient Medications   Medication Sig Dispense Refill    montelukast (SINGULAIR) 10 MG tablet Take 1 tablet by mouth nightly 90 tablet 3    albuterol sulfate HFA (PROVENTIL;VENTOLIN;PROAIR) 108 (90 Base) MCG/ACT inhaler Inhale 2 puffs into the lungs every 6 hours as needed for Wheezing 1 each 11    levothyroxine (SYNTHROID) 50 MCG tablet take 1 tablet by mouth once daily 30 tablet 5    aspirin EC 81 MG EC tablet Take 81 mg by mouth every morning (before breakfast)       No current facility-administered medications for this visit. Physical Exam  Vitals and nursing note reviewed. Constitutional:       Appearance: He is well-developed. HENT:      Nose: Nose normal. No congestion. Mouth/Throat:      Mouth: Mucous membranes are moist.      Pharynx: Oropharynx is clear. No oropharyngeal exudate. Eyes:      General: No scleral icterus. Conjunctiva/sclera: Conjunctivae normal.   Neck:      Thyroid: No thyromegaly. Vascular: No JVD. Trachea: No tracheal deviation.    Cardiovascular:      Rate and Rhythm: Normal rate and regular rhythm. Heart sounds: Normal heart sounds. No murmur heard. No gallop. Pulmonary:      Effort: Pulmonary effort is normal. No respiratory distress. Breath sounds: No wheezing or rales. Chest:      Chest wall: No tenderness. Abdominal:      Palpations: Abdomen is soft. Tenderness: There is no abdominal tenderness. Musculoskeletal:      Cervical back: Neck supple. Right lower leg: No edema. Left lower leg: No edema. Comments: No clubbing   Lymphadenopathy:      Cervical: No cervical adenopathy. Skin:     General: Skin is warm and dry. Neurological:      Mental Status: He is alert and oriented to person, place, and time. Wt Readings from Last 3 Encounters:   07/29/22 192 lb (87.1 kg)   07/30/21 187 lb (84.8 kg)   07/24/20 185 lb (83.9 kg)     Results for orders placed or performed during the hospital encounter of 07/25/22   PSA, Diagnostic   Result Value Ref Range    PSA 3.96 <4.1 ng/mL       :      1. Mild persistent asthma without complication    2. Exercise-induced asthma      Patient Active Problem List   Diagnosis    Mild intermittent asthma without complication    Venous stasis ulcer of ankle (HCC)    Dry ear canal    Irritation of left eye    Hx of factor V Leiden mutation    Hypercholesteremia    Abnormal EKG    Acute pain of right knee    Acute conjunctivitis of both eyes    Positive FIT (fecal immunochemical test)    Acquired hypothyroidism    Perennial allergic rhinitis    Erectile dysfunction    Benign prostatic hyperplasia         Plan:      Continue bronchodilators  Discussed strategies for management of asthma, including evaluation and descalation of therapy  Avoid environmental triggers  Refilled asthma medications. Discussed strategies to manage exercise-induced asthma. Flu shot in fall. COVID boosters as available.   Return to clinic in 12 months    Orders Placed This Encounter   Medications    montelukast (SINGULAIR) 10 MG tablet     Sig: Take 1 tablet by mouth nightly     Dispense:  90 tablet     Refill:  3    albuterol sulfate HFA (PROVENTIL;VENTOLIN;PROAIR) 108 (90 Base) MCG/ACT inhaler     Sig: Inhale 2 puffs into the lungs every 6 hours as needed for Wheezing     Dispense:  1 each     Refill:  11     No orders of the defined types were placed in this encounter. Return in about 1 year (around 7/29/2023).        Electronically signed by Robert Sumner DO on 7/29/2022at 9:25 PM

## 2022-08-01 ENCOUNTER — HOSPITAL ENCOUNTER (OUTPATIENT)
Dept: PHYSICAL THERAPY | Facility: CLINIC | Age: 72
Setting detail: THERAPIES SERIES
Discharge: HOME | End: 2022-08-01
Payer: MEDICARE

## 2022-08-01 PROCEDURE — 97016 VASOPNEUMATIC DEVICE THERAPY: CPT

## 2022-08-01 PROCEDURE — 97110 THERAPEUTIC EXERCISES: CPT

## 2022-08-01 NOTE — CARE COORDINATION
Medicare Cap   [x] Physical Therapy  [] Speech Therapy  [] Occupational therapy  *PT and Speech caps combine      $2150 Limit for PT and Speech combined  $2150 Limit for OT individually  At the beginning of the month where you expect to go over $2150, please add the [address] [place] [address] modifier      Patient Name: jT Hernandez  YOB: 1950    Note:  This is an estimate of charges billed.      Date of [address] Name # units/ charge $$$ charge Daily Total Charge Ongoing Total $$$   6/6/22 IE, man, carlos 1x1x1  142.19 142.19   6/13/22 carlos 3, man1   96.23 238.42   6/27/22 [place], man   93.91 332.33   7/5/22 TE,man,vaso 2,1,1 25.10+19.62+18.34+7.91 70.97 403.30   7/11/22 Carlos,man, vaso 2,1,1  70.97    8/1/22 Carlos, vaso 3,1  82.07 556.34

## 2022-08-01 NOTE — FLOWSHEET NOTE
[x] [facility]    [address], [place]    Phone: [phone]    Fax:  [phone]       Physical Therapy Daily Treatment Note    Date:  2022  Patient Name:  Chapis Blevins    :  1950  MRN: [id number]  Physician: Dr. Eric Hayes: Medicare  Medical Diagnosis:   R achilles tendonitis  Rehab Codes:  M79.671  Onset date:    22                                   Next Dr's appt.: none  Visit# / total visits: 6/10   Cancels/No Shows: 0/0  Next goal race: [email or web address] 10k relay run portion    Subjective:   Pain:  [x] Yes  [] No Location: R achilles  Pain Rating: (0-10 scale) 3-4/10  Comments:   he walked a 5k this past weekend. He iced the AT Friday for the first time and felt much better. Not compliant with HEP. Purchased a percussion. Has a little bit of ache in L upper glut this am. Overall, the achilles is getting better. Objective:  Modalities:   Treatment Location  Left      Right                          Position   R lower leg []          [x]  [x] Supine    [] Prone   [] Side lying  [] Sitting          Treatment Modality   2 Vasocompression    34° temp    Med pressure     15min   1 Other:  ex       Precautions: standard  Exercises:  Exercise Reps/ Time Weight/ Level   Comments   SB gastroc stretch 3x30\"      SB soleus stretch 3x30\"      Stool HS stretch 3x30\"             Prone        Quad stretch 3x30\"   W/ strap   Supine          2 legged bridges 20        1 legged bridges 10    radha   David stretch 3x30\"       Sukh calf stretch 3x30\"        Inversion/eversion stretch 3x30\"      Sidelying           hip abd 2x10        Gym       Hip adductor stretch 3x30\"   X     Posterior lunges in //bars 2x10   X     Eccentric calf raises 3x10  10 lbs X    BAPS 3x10 L2 X      Monster walks 4x20' orange X Advance to blue, issued blue for HEP   Lateral step ups 2x10 6\" X Radha    Other:  Manual  1. IASTM to calf with Hypervolt, STR R calf-performing at home     Specific Instructions for next treatment:  1.  continue to encourage strengthening program      Treatment Charges: Mins Units   []  Phys perf test     [x]  Ther Exercise 40 3   []  Manual Therapy     []  Ther Activities     []  NMR     [x]  Vasocompression 15 1   []  Other     Total Treatment time 55 4       Assessment: [x] Progressing toward goals. He demonstrates significant weakness in quad and gluts. Provided an updated electronic and written copies of his HEP. Functionally, he demonstrates genu valgus weakness, stiff calves. Encouraged increased compliance to HEP. [] No change. [] Other:    Goals:  STG: (to be met in 5 treatments)  ? Pain: <3/10 at all times so that he can continue to run.  ? ROM: with DF and all hip movements  ? Strength: eccentrically with calf and hip ext and abd  ? Function: able to race 5k with <3/10 pain  LEFI score to 80/80  Independent with Home Exercise Programs     LTG: (to be met in 10 treatments)  No pain in R achilles  MMT with >4/5   Able to run without R achilles pain                 Patient goals:\"be able to continue running\"    Pt. Education:  [x] Yes  [] No  [x] Reviewed Prior HEP/Ed   Method of Education  [x] Verbal   To consider continuing with yoga at the end of PT  Encouraged him use ice pack 3x per day. Even recommended Freeze Sleeve  Provided link on [employer] for stretch strap     [x] Demo  [x] Written  Access Code: [email or web address]  URL: [email or web address]  Date: 08/01/2022  Prepared by: Lino Newer    Exercises  Single Leg Bridge - 2 x daily - 7 x weekly - 2 sets - 10 reps  Prone Quadriceps Stretch with Strap - 2 x daily - 7 x weekly - 1 sets - 3 reps - 30 sec hold  hip adductor stretch with foot on step - 2 x daily - 7 x weekly - 1 sets - 3 reps - 30 sec hold  Standing Eccentric Heel Raise - 2 x daily - 7 x weekly - 3 sets - 10 reps  Mini Lunge - 2 x daily - 7 x weekly - 2 sets - 10 reps  Lunge with Counter Support - 2 x daily - 7 x weekly - 2 sets - 10 reps  Squat with Chair Touch and Resistance Loop - 2 x daily - 7 x weekly - 3 sets - 10 reps  Side Stepping with Resistance at Feet - 2 x daily - 5 x weekly - 4 sets - 10-15 reps  Lateral Step Down - 2 x daily - 5 x weekly - 2 sets - 10-20 reps      Comprehension of Education:  [] Verbalizes understanding. [] Demonstrates understanding. [x] Needs review. [] Demonstrates/verbalizes HEP/Ed previously given. Plan: [x] Continue per plan of care.  1x/wk     [] Other:     Time In: 0900  Time Out: 1011    Electronically signed by:  Red Murrell PT

## 2022-08-08 ENCOUNTER — HOSPITAL ENCOUNTER (OUTPATIENT)
Dept: PHYSICAL THERAPY | Facility: CLINIC | Age: 72
Setting detail: THERAPIES SERIES
Discharge: HOME | End: 2022-08-08
Payer: MEDICARE

## 2022-08-08 PROCEDURE — 97110 THERAPEUTIC EXERCISES: CPT

## 2022-08-08 PROCEDURE — 97016 VASOPNEUMATIC DEVICE THERAPY: CPT

## 2022-08-08 NOTE — FLOWSHEET NOTE
[x] [facility]    [address], [place]    Phone: [phone]    Fax:  [phone]       Physical Therapy Daily Treatment Note    Date:  2022  Patient Name:  Nish Bentley    :  1950  MRN: [id number]  Physician: Dr. Yessy Munoz: Medicare  Medical Diagnosis:   R achilles tendonitis  Rehab Codes:  M79.671  Onset date:    22                                   Next Dr's appt.: none  Visit# / total visits: 7/10   Cancels/No Shows: 0/0  Next goal race: [employer] 10k relay run portion    Subjective:   Pain:  [x] Yes  [] No Location: R achilles  Pain Rating: (0-10 scale) 3-4/10  Comments:  did walk/run 10k at [employer] at 90 min, which was his goal.  No issues with AT. Feels pretty good. He did purchase a Freeze Sleeve  Objective:  Modalities:   Treatment Location  Left      Right                          Position   R lower leg []          [x]  [x] Supine    [] Prone   [] Side lying  [] Sitting          Treatment Modality   2 Vasocompression    34° temp    Med pressure     15min   1 Other:  ex       Precautions: standard  Exercises:  Exercise Reps/ Time Weight/ Level   Comments   SB gastroc stretch 3x30\"      SB soleus stretch 3x30\"      Stool HS stretch 3x30\"             Prone        Quad stretch 3x30\"   W/ strap   Supine          2 legged bridges 20  6\" step x     1 legged bridges Not able   -- radha   David stretch 3x30\"       Sukh calf stretch 3x30\"        Inversion/eversion stretch 3x30\"      Sidelying           hip abd 2x10        Gym       Hip adductor stretch 3x30\"        Posterior lunges in //bars 2x10        Eccentric calf raises 3x10 10 lbs x    BAPS 3x10 L2 x      Monster walks 4x20' blue x Advance to blue, issued blue for HEP   Lateral step ups 2x10 6\" x Radha    Other:  Manual  1.     IASTM to calf with Hypervolt, STR R calf-performing at home     Specific Instructions for next treatment:  1.  continue to encourage strengthening program      Treatment Charges: Mins Units   []  Phys perf test     [x]  Ther Exercise 40 3   []  Manual Therapy     []  Ther Activities     []  NMR     [x]  Vasocompression 15 1   []  Other     Total Treatment time 55 4       Assessment: [x] Progressing toward goals. He continues to demonstrate significant weakness in quad and gluts. Functionally, he demonstrates genu valgus weakness, stiff calves. Encouraged increased compliance to HEP. [] No change. [] Other:    Goals:  STG: (to be met in 5 treatments)  ? Pain: <3/10 at all times so that he can continue to run.  ? ROM: with DF and all hip movements  ? Strength: eccentrically with calf and hip ext and abd  ? Function: able to race 5k with <3/10 pain  LEFI score to 80/80  Independent with Home Exercise Programs     LTG: (to be met in 10 treatments)  No pain in R achilles  MMT with >4/5   Able to run without R achilles pain                 Patient goals:\"be able to continue running\"    Pt. Education:  [x] Yes  [] No  [x] Reviewed Prior HEP/Ed   Method of Education  [x] Verbal   To consider continuing with yoga at the end of PT  Encouraged him use ice pack 3x per day. Even recommended Freeze Sleeve  Provided link on [employer] for stretch strap     [x] Demo  [x] Written  Access Code: [email or web address]  URL: [email or web address]  Date: 08/01/2022  Prepared by: Owen Darden    Exercises  Single Leg Bridge - 2 x daily - 7 x weekly - 2 sets - 10 reps  Prone Quadriceps Stretch with Strap - 2 x daily - 7 x weekly - 1 sets - 3 reps - 30 sec hold  hip adductor stretch with foot on step - 2 x daily - 7 x weekly - 1 sets - 3 reps - 30 sec hold  Standing Eccentric Heel Raise - 2 x daily - 7 x weekly - 3 sets - 10 reps  Mini Lunge - 2 x daily - 7 x weekly - 2 sets - 10 reps  Lunge with Counter Support - 2 x daily - 7 x weekly - 2 sets - 10 reps  Squat with Chair Touch and Resistance Loop - 2 x daily - 7 x weekly - 3 sets - 10 reps  Side Stepping with Resistance at Feet - 2 x daily - 5 x weekly - 4 sets - 10-15 reps  Lateral Step Down - 2 x daily - 5 x weekly - 2 sets - 10-20 reps      Comprehension of Education:  [] Verbalizes understanding. [] Demonstrates understanding. [x] Needs review. [] Demonstrates/verbalizes HEP/Ed previously given. Plan: [x] Continue per plan of care.  Looking out from today, pt wishes to schedule 2 wks out   [] Other:     Time In: 1506  Time Out: 8[address] [facility]    Electronically signed by:  Barby Nunez, PT

## 2022-08-22 ENCOUNTER — HOSPITAL ENCOUNTER (OUTPATIENT)
Dept: PHYSICAL THERAPY | Facility: CLINIC | Age: 72
Setting detail: THERAPIES SERIES
Discharge: HOME | End: 2022-08-22
Payer: MEDICARE

## 2022-08-22 PROCEDURE — 97110 THERAPEUTIC EXERCISES: CPT

## 2022-08-22 NOTE — CARE COORDINATION
Medicare Cap   [x] Physical Therapy  [] Speech Therapy  [] Occupational therapy  *PT and Speech caps combine      $2150 Limit for PT and Speech combined  $2150 Limit for OT individually  At the beginning of the month where you expect to go over $2150, please add the [address] [place] modifier      Patient Name: Yanelis Posey  YOB: 1950    Note:  This is an estimate of charges billed.      Date of [address] Name # units/ charge $$$ charge Daily Total Charge Ongoing Total $$$   6/6/22 IE, man, carlos 1x1x1  142.19 142.19   6/13/22 carlos 3, man1   96.23 238.42   6/27/22 [place], man   93.91 332.33   7/5/22 TE,man,vaso 2,1,1 25.10+19.62+18.34+7.91 70.97 403.30   7/11/22 Carlos,man, vaso 2,1,1  70.97    8/1/22 Carlos, vaso 3,1  82.07 556.34   8/8 Carlos, vaso 3,1  82.07 638.41   8/22 carlos 4  95.37 733.78

## 2022-08-22 NOTE — FLOWSHEET NOTE
[x] [facility]    [address] [facility]    Phone: [phone]    Fax:  [phone]       Physical Therapy Daily Treatment Note    Date:  2022  Patient Name:  Jarad Headley    :  1950  MRN: [id number]  Physician: Dr. Urban Obed: Medicare  Medical Diagnosis:   R achilles tendonitis  Rehab Codes:  R08.685  Onset date:    22                                   Next Dr's appt.: none  Visit# / total visits: 810   Cancels/No Shows: 0/0  Next goal race: Carmen Alfredo     Subjective:   Pain:  [x] Yes  [] No Location: R achilles  Pain Rating: (0-10 scale) 0/10  Comments:  did a 5k yesetday and ran 100% of it. 35 min. 30-31 min is typical.  1.5/10 when walking around. Yesterday was the first run in 1 month+ . Compliant with HEP 3 days/wk. f  Objective:  Modalities:   Treatment Location  Left      Right                          Position   R lower leg []          [x]  [x] Supine    [] Prone   [] Side lying  [] Sitting          Treatment Modality   2 Vasocompression    34° temp    Med pressure     15min   1 Other:  ex       Precautions: standard  Exercises:  Exercise Reps/ Time Weight/ Level   Comments   Gym       4 way hip 2x10 blue x Issued blue TB   Hip adductor stretch 3x30\"  8\" x     Posterior lunges in //bars 15   x     Eccentric calf raises 2x15 15 lbs x    BAPS 3x10 L2 x  pt requests L3 next visit   Monster walks 4x20' blue x Advance to blue, issued blue for HEP   step ups 15 8\" x Posterior and lateral    Other:  Manual  1.     IASTM to calf with [employer], STR R calf-performing at home     Specific Instructions for next treatment:  1.  continue to encourage strengthening program      Treatment Charges: Mins Units   []  Phys perf test     [x]  Ther Exercise 53 4   []  Manual Therapy     []  Ther Activities     []  NMR     []  Vasocompression     []  Other     Total Treatment time 53 4 Assessment: [x] Progressing toward goals. Advanced his clinic program  for strengthening and updated his HEP. He continues to demonstrate weakness in radha LEs. L more so than the R.     [] No change. [] Other:    Goals:  STG: (to be met in 5 treatments)  ? Pain: <3/10 at all times so that he can continue to run.  ? ROM: with DF and all hip movements  ? Strength: eccentrically with calf and hip ext and abd  ? Function: able to race 5k with <3/10 pain  LEFI score to 80/80  Independent with Home Exercise Programs     LTG: (to be met in 10 treatments)  No pain in R achilles  MMT with >4/5   Able to run without R achilles pain                 Patient goals:\"be able to continue running\"    Pt. Education:  [x] Yes  [] No  [x] Reviewed Prior HEP/Ed   Method of Education  [x] Verbal   To consider continuing with yoga at the end of PT  Encouraged him use ice pack 3x per day. Even recommended Freeze Sleeve  Provided link on [employer] for stretch strap     [x] Demo  [x] Written  Access Code: [email or web address]  Access Code: [email or web address]  URL: [email or web address]  Date: 08/22/2022  Prepared by: Alana Jaramillo    Exercises  Prone Quadriceps Stretch with Strap - 2 x daily - 7 x weekly - 1 sets - 3 reps - 30 sec hold  hip adductor stretch with foot on step - 2 x daily - 7 x weekly - 1 sets - 3 reps - 30 sec hold  Standing Eccentric Heel Raise - 2 x daily - 7 x weekly - 3 sets - 10 reps  Mini Lunge - 2 x daily - 7 x weekly - 2 sets - 10 reps  Squat with Chair Touch and Resistance Loop - 2 x daily - 7 x weekly - 3 sets - 10 reps  Side Stepping with Resistance at Feet - 2 x daily - 5 x weekly - 4 sets - 10-15 reps  Standing Repeated Hip Flexion with Resistance - 2 x daily - 7 x weekly - 2 sets - 10 reps  Standing Repeated Hip Extension with Resistance - 1 x daily - 7 x weekly - 3 sets - 10 reps  Standing Hip Adduction with Anchored Resistance - 2 x daily - 7 x weekly - 2 sets - 10 reps  Standing Hip Abduction with Anchored Resistance - 2 x daily - 7 x weekly - 2 sets - 10 reps  Forward T with Counter Support - 2 x daily - 7 x weekly - 15 sets - 15 reps  Side Step Down with Counter Support - 2 x daily - 7 x weekly - 2 sets - 15 reps  Lunge with Counter Support - 2 x daily - 7 x weekly - 2 sets - 15 reps  Backward Step Down with Unilateral Counter Support - 2 x daily - 7 x weekly - 2 sets - 15 reps      Comprehension of Education:  [] Verbalizes understanding. [] Demonstrates understanding. [x] Needs review. [] Demonstrates/verbalizes HEP/Ed previously given. Plan: [x] Continue per plan of care.  Looking out from today, pt wishes to schedule in 3 wks    [] Other:     Time In: 1100  Time Out: [address]    Electronically signed by:  Barby Nunez, PT

## 2022-09-12 ENCOUNTER — HOSPITAL ENCOUNTER (OUTPATIENT)
Dept: PHYSICAL THERAPY | Facility: CLINIC | Age: 72
Setting detail: THERAPIES SERIES
Discharge: HOME | End: 2022-09-12
Payer: MEDICARE

## 2022-09-12 PROCEDURE — 97110 THERAPEUTIC EXERCISES: CPT

## 2022-09-12 NOTE — CARE COORDINATION
Medicare Cap   [x] Physical Therapy  [] Speech Therapy  [] Occupational therapy  *PT and Speech caps combine      $2150 Limit for PT and Speech combined  $2150 Limit for OT individually  At the beginning of the month where you expect to go over $2150, please add the [address] [place] [address] modifier      Patient Name: Jeyson Wilson  YOB: 1950    Note:  This is an estimate of charges billed.      Date of [address] Name # units/ charge $$$ charge Daily Total Charge Ongoing Total $$$   6/6/22 IE, man, carlos 1x1x1  142.19 142.19   6/13/22 carlos 3, man1   96.23 238.42   6/27/22 [place], man   93.91 332.33   7/5/22 TE,man,vaso 2,1,1 25.10+19.62+18.34+7.91 70.97 403.30   7/11/22 Carlos,man, vaso 2,1,1  70.97    8/1/22 Carlos, vaso 3,1  82.07 556.34   8/8 Carlos, vaso 3,1  82.07 638.41   8/22 carlos 4  95.37 733.78   9/12 carlos 3  74.16 807.94

## 2022-09-12 NOTE — FLOWSHEET NOTE
[x] [facility]    [address] [place], [place]    Phone: [phone]    Fax:  [phone]       Physical Therapy Daily Treatment Note    Date:  2022  Patient Name:  Ashlee Martinez    :  1950  MRN: [id number]  Physician: Dr. Kita Ham: Medicare  Medical Diagnosis:   R achilles tendonitis  Rehab Codes:  C65.459  Onset date:    22                                   Next Dr's appt.: none  Visit# / total visits: 9/10   Cancels/No Shows: 0/0  Next goal race: Rubina Driscoll     Subjective:   Pain:  [x] Yes  [] No Location: R achilles  Pain Rating: (0-10 scale) 0/10  Comments:  he has run several 5ks and 00% of it. 35 min. 30-31 min is typical.  He wishes to continue PT to further strengthen his legs, especially the L as he feels it is weaker than the R and what led to his issues. .      Objective:  Modalities: Game Ready PRN  Precautions: standard  Exercises:  Exercise Reps/ Time Weight/ Level   Comments   Gym       Chair squats 20  x                         4 way hip 2x10 blue x Issued blue TB   Hip adductor stretch 3x30\"  8\" x     Posterior lunges in //bars 15   x     Eccentric calf raises 2x15 15 lbs x    BAPS 3x10 L2 --  pt requests L3 next visit   Monster walks 4x20' blue x Advance to blue, issued blue for HEP   step ups 20  20 6\"  4\" X  X Posterior and lateral    Other:       Specific Instructions for next treatment:  1.  continue to encourage strengthening program      Treatment Charges: Mins Units   []  Phys perf test     [x]  Ther Exercise 50 3   []  Manual Therapy     []  Ther Activities     []  NMR     []  Vasocompression     []  Other     Total Treatment time         Assessment: [x] Progressing toward goals. Pt was progressed on his HEP for leg strengthening, both functional and reclined ex. He is pleased with his HEP.   At this point, the Achilles is no longer the issue as much as his lack of running fitness     [] No change. [] Other:    Goals:  STG: (to be met in 5 treatments)  ? Pain: <3/10 at all times so that he can continue to run.  ? ROM: with DF and all hip movements  ? Strength: eccentrically with calf and hip ext and abd  ? Function: able to race 5k with <3/10 pain  LEFI score to 80/80  Independent with Home Exercise Programs     LTG: (to be met in 10 treatments)  No pain in R achilles  MMT with >4/5   Able to run without R achilles pain                 Patient goals:\"be able to continue running\"    Pt. Education:  [x] Yes  [] No  [x] Reviewed Prior HEP/Ed   Method of Education  [x] Verbal   To consider continuing with yoga at the end of PT  Encouraged him use ice pack 3x per day. Even recommended Freeze Sleeve  Provided link on [employer] for stretch strap     [x] Demo  [x] Written    Access Code: [email or web address]  URL: [email or web address]  Date: 09/12/2022  Prepared by: Waqas Drummond    Exercises  Prone Quadriceps Stretch with Strap - 2 x daily - 7 x weekly - 1 sets - 3 reps - 30 sec hold  hip adductor stretch with foot on step - 2 x daily - 7 x weekly - 1 sets - 3 reps - 30 sec hold  Standing Eccentric Heel Raise - 2 x daily - 7 x weekly - 3 sets - 10 reps  Squat with Chair Touch and Resistance Loop - 2 x daily - 7 x weekly - 3 sets - 10 reps  Side Stepping with Resistance at Feet - 2 x daily - 5 x weekly - 4 sets - 10-15 reps  Standing Repeated Hip Flexion with Resistance - 2 x daily - 7 x weekly - 2 sets - 10 reps  Standing Repeated Hip Extension with Resistance - 1 x daily - 7 x weekly - 3 sets - 10 reps  Standing Hip Abduction with Anchored Resistance - 2 x daily - 7 x weekly - 2 sets - 10 reps  Side Step Down with Counter Support - 2 x daily - 7 x weekly - 2 sets - 15 reps  Lunge with Counter Support - 2 x daily - 7 x weekly - 2 sets - 15 reps  Backward Step Down with Unilateral Counter Support - 2 x daily - 7 x weekly - 2 sets - 15 reps  Supine Bridge with Resistance Band - 2 x daily - 7 x weekly - 3 sets - 10 reps  Clamshell with Resistance - 2 x daily - 7 x weekly - 2 sets - 10 reps  Sidelying Hip Abduction - 2 x daily - 7 x weekly - 2 sets - 10 reps  Quadruped Hip Extension Kicks - 2 x daily - 7 x weekly - 2 sets - 10 reps    Comprehension of Education:  [] Verbalizes understanding. [] Demonstrates understanding. [x] Needs review. [] Demonstrates/verbalizes HEP/Ed previously given. Plan: [x] Continue per plan of care.  [] Other: pt requests 3 wks until the next visit     Time In: [address] [facility]  Time Out: [address] [facility]    Electronically signed by:  Renetta Tristan PT

## 2022-10-03 ENCOUNTER — HOSPITAL ENCOUNTER (OUTPATIENT)
Dept: PHYSICAL THERAPY | Facility: CLINIC | Age: 72
Setting detail: THERAPIES SERIES
Discharge: HOME | End: 2022-10-03
Payer: MEDICARE

## 2022-10-03 PROCEDURE — 97110 THERAPEUTIC EXERCISES: CPT

## 2022-10-03 NOTE — CARE COORDINATION
Medicare Cap   [x] Physical Therapy  [] Speech Therapy  [] Occupational therapy  *PT and Speech caps combine      $2150 Limit for PT and Speech combined  $2150 Limit for OT individually  At the beginning of the month where you expect to go over $2150, please add the [address] [place] [address] modifier      Patient Name: Sai Teran  YOB: 1950    Note:  This is an estimate of charges billed.      Date of [address] Name # units/ charge $$$ charge Daily Total Charge Ongoing Total $$$   6/6/22 IE, man, carlos 1x1x1  142.19 142.19   6/13/22 carlos 3, man1   96.23 238.42   6/27/22 [place], man   93.91 332.33   7/5/22 TE,man,vaso 2,1,1 25.10+19.62+18.34+7.91 70.97 403.30   7/11/22 Carlos,man, vaso 2,1,1  70.97    8/1/22 Carlos, vaso 3,1  82.07 556.34   8/8 Carlos, vaso 3,1  82.07 638.41   8/22 carlos 4  95.37 733.78   9/12 carlos 3  74.16 807.94   10/3 carlos 4  95.37 903.31

## 2022-10-03 NOTE — FLOWSHEET NOTE
[x] [facility]    [address] [place], [place]    Phone: [phone]    Fax:  [phone]       Physical Therapy Daily Treatment Note    Date:  10/3/2022  Patient Name:  Maximino Reeder    :  1950  MRN: [id number]  Physician: Dr. Willett Angle: Medicare  Medical Diagnosis:   R achilles tendonitis  Rehab Codes:  N13.186  Onset date:    22                                   Next Dr's appt.: none  Visit# / total visits: 9/10   Cancels/No Shows: 0/0  Next goal race: Reda Must     Subjective:   Pain:  [x] Yes  [] No Location: R achilles  Pain Rating: (0-10 scale) 0/10  Comments:  he has run 5x  5ks  31: 26 .  30-31 min is typical. No issue with running. Will still wake up at night with twinges, he can feel it on stairs. He is doing his HEP and no issues  Objective:  Modalities: Game Ready PRN  Precautions: standard  Exercises:  Exercise Reps/ Time Weight/ Level   Comments   Gym       Chair squats 20 20 lbs X    Prone quad stretch 3x30\"  X           Heel taps 15 4\" X    4 way hip 15 purple X Issued purple TB   Hip adductor stretch 3x30\"  8\" X     Posterior lunges in //bars 15   X     Eccentric calf raises 2x15 15 lbs     BAPS 3x10 L2 --  pt requests L3 next visit   Monster walks 4x20' black X  issued black for HEP   step ups 15 6\" X Posterior and lateral    Other:    Specific Instructions for next treatment:  1.  continue to encourage strengthening program    Treatment Charges: Mins Units   []  Phys perf test     [x]  Ther Exercise 65 4   []  Manual Therapy     []  Ther Activities     []  NMR     []  Vasocompression     []  Other     Total Treatment time 65 4       Assessment: [x] Progressing toward goals. Pt was progressed on his HEP for leg strengthening,  He wishes to have all of his ex in his HEP for reference. His running fitness level is improving. Strength is also improving. [] No change.      [] Other:    Goals:  STG: (to be met in 5 treatments)  ? Pain: <3/10 at all times so that he can continue to run.  ? ROM: with DF and all hip movements  ? Strength: eccentrically with calf and hip ext and abd  ? Function: able to race 5k with <3/10 pain  LEFI score to 80/80  Independent with Home Exercise Programs     LTG: (to be met in 10 treatments)  No pain in R achilles  MMT with >4/5   Able to run without R achilles pain                 Patient goals:\"be able to continue running\"    Pt. Education:  [x] Yes  [] No  [x] Reviewed Prior HEP/Ed   Method of Education  [x] Verbal   To consider continuing with yoga at the end of PT  Encouraged him use ice pack 3x per day. Even recommended Freeze Sleeve  Provided link on [employer] for stretch strap  Reviewed purchase of a dorsal night splint to maintain neutral foot/ankle position. [x] Demo  [x] Written  Access Code: [email or web address]  URL: [employer]/  Date: 10/03/2022  Prepared by: Alia Tafoya    Exercises  Standing Shoulder Row with Anchored Resistance - 1 x daily - 4 x weekly - 2 sets - 15 reps  Shoulder Extension with Resistance - 1 x daily - 4 x weekly - 2 sets - 15 reps  CLX Shoulder Overhead Press with Hand Closed - 1 x daily - 4 x weekly - 2 sets - 15 reps  Standing Bicep Curls with Resistance - 1 x daily - 4 x weekly - 2 sets - 15 reps  Standing Elbow Extension with Anchored Resistance - 1 x daily - 4 x weekly - 2 sets - 15 reps  Standing Shoulder Horizontal Abduction with Resistance - 1 x daily - 4 x weekly - 2 sets - 15 reps    Access Code: [email or web address]  URL: [employer]/  Date: 10/03/2022  Prepared by: Alia Tafoya    Exercises  Prone Quadriceps Stretch with Strap - 2 x daily - 7 x weekly - 1 sets - 3 reps - 30 sec hold  Squat with Chair Touch and Resistance Loop - 2 x daily - 7 x weekly - 3 sets - 10 reps  hip adductor stretch with foot on step - 2 x daily - 7 x weekly - 1 sets - 3 reps - 30 sec hold  Standing Eccentric Heel Raise - 2 x daily - 7 x weekly - 3 sets - 10 reps  Side Stepping with Resistance at Feet - 2 x daily - 5 x weekly - 4 sets - 10-15 reps  Standing Repeated Hip Flexion with Resistance - 2 x daily - 7 x weekly - 2 sets - 10 reps  Standing Repeated Hip Extension with Resistance - 1 x daily - 7 x weekly - 3 sets - 10 reps  Standing Hip Abduction with Anchored Resistance - 2 x daily - 7 x weekly - 2 sets - 10 reps  Side Step Down with Counter Support - 2 x daily - 7 x weekly - 2 sets - 15 reps  Lunge with Counter Support - 2 x daily - 7 x weekly - 2 sets - 15 reps  Backward Step Down with Unilateral Counter Support - 2 x daily - 7 x weekly - 2 sets - 15 reps  Supine Bridge with Resistance Band - 2 x daily - 7 x weekly - 3 sets - 10 reps  Clamshell with Resistance - 2 x daily - 7 x weekly - 2 sets - 10 reps  Sidelying Hip Abduction - 2 x daily - 7 x weekly - 2 sets - 10 reps  Quadruped Hip Extension Kicks - 2 x daily - 7 x weekly - 2 sets - 10 reps      Comprehension of Education:  [] Verbalizes understanding. [] Demonstrates understanding. [x] Needs review. [] Demonstrates/verbalizes HEP/Ed previously given. Plan: [x] Continue per plan of care.  [] Other: pt requests to follow up in 2 wks before he leaves for hospitals     Time In: 1005  Time Out: [address]    Electronically signed by:  Marifer Santoyo, PT

## 2022-10-17 ENCOUNTER — HOSPITAL ENCOUNTER (OUTPATIENT)
Dept: PHYSICAL THERAPY | Facility: CLINIC | Age: 72
Setting detail: THERAPIES SERIES
Discharge: HOME | End: 2022-10-17
Payer: MEDICARE

## 2022-10-17 PROCEDURE — 97110 THERAPEUTIC EXERCISES: CPT

## 2022-10-17 NOTE — PROGRESS NOTES
[x] [facility]    [address], [place]    Phone: [phone]    Fax:  [phone]       Physical Therapy Daily Treatment/Progress Note    Date:  10/17/2022  Patient Name:  Ksenia Rivera    :  1950  MRN: [id number]  Physician: Dr. Trejo Schools: Medicare  Medical Diagnosis:   R achilles tendonitis  Rehab Codes:  M79.671  Onset date:    22                                   Next Dr's appt.: none  Visit# / total visits: 10/10   Cancels/No Shows: 0/0  Next goal race: [place] 10k    Subjective:   Pain:  [x] Yes  [] No Location: R achilles  Pain Rating: (0-10 scale) 0/10  Comments:  he ran a 10k in 70 min and no issues. Minimal R Achilles issues. Continuing to work on L LE strength. Goals for today are per pt request  1. Advance HEP if needed  2  review how to don night splint. Objective:  Modalities: Game Ready PRN  Precautions: standard  Exercises:  Exercise Reps/ Time Weight/ Level   Comments   Gym       Chair squats 20 20 lbs     Prone quad stretch 3x30\"                    Split squats 2x15  X    Heel taps 15 6\" X    4 way hip 15 purple  Issued purple TB   Hip adductor stretch 3x30\"  8\" X     Posterior lunges in //bars 15        Eccentric calf raises 2x15 15 lbs     BAPS 3x10 L2   pt requests L3 next visit   Monster walks 4x20' black   issued black for HEP   step ups 15 8\" X Posterior and lateral    Other:    Specific Instructions for next treatment:  1.  continue to encourage strengthening program    Treatment Charges: Mins Units   []  Phys perf test     [x]  Ther Exercise 30 2   []  Manual Therapy     []  Ther Activities     []  NMR     []  Vasocompression     []  Other     Total Treatment time 30 2       Assessment: [x] Progressing toward goals. Pt was progressed on his HEP for leg strengthening, by increasing step height and adding split squats. Issued modified HEP.   Pt is pleased with his progress   He wishes to have all of his ex in his HEP for reference. Functionally, he is doing very well     [] No change. [] Other:    Goals:  STG: (to be met in 5 treatments)  ? Pain: <3/10 at all times so that he can continue to run.  ? ROM: with DF and all hip movements  ? Strength: eccentrically with calf and hip ext and abd  ? Function: able to race 5k with <3/10 pain  LEFI score to 80/80  Independent with Home Exercise Programs     LTG: (to be met in 10 treatments)  No pain in R achilles  MMT with >4/5   Able to run without R achilles pain                 Patient goals:\"be able to continue running\"    Pt. Education:  [x] Yes  [] No  [x] Reviewed Prior HEP/Ed   Method of Education  [x] Verbal   To consider continuing with yoga at the end of PT  Encouraged him use ice pack 3x per day. Even recommended Freeze Sleeve  Provided link on [employer] for stretch strap  Reviewed purchase of a dorsal night splint to maintain neutral foot/ankle position. [x] Alvarez  [x] Written  Access Code: [email or web address]  URL: [email or web address]  Date: 10/03/2022  Prepared by: Ranjit Alaniz    Exercises  Standing Shoulder Row with Anchored Resistance - 1 x daily - 4 x weekly - 2 sets - 15 reps  Shoulder Extension with Resistance - 1 x daily - 4 x weekly - 2 sets - 15 reps  CLX Shoulder Overhead Press with Hand Closed - 1 x daily - 4 x weekly - 2 sets - 15 reps  Standing Bicep Curls with Resistance - 1 x daily - 4 x weekly - 2 sets - 15 reps  Standing Elbow Extension with Anchored Resistance - 1 x daily - 4 x weekly - 2 sets - 15 reps  Standing Shoulder Horizontal Abduction with Resistance - 1 x daily - 4 x weekly - 2 sets - 15 reps    Access Code: [email or web address]  URL: [email or web address]  Date: 10/17/2022  Prepared by: Ranjit Alaniz    Exercises  Side Stepping with Resistance at Feet - 2 x daily - 5 x weekly - 4 sets - 10-15 reps  Split Squat Lunge - 2 x daily - 5 x weekly - 2 sets - 15-20 reps  Side Step Down with Counter Support - 2 x daily - 7 x weekly - 2 sets - 15 reps  Prone Quadriceps Stretch with Strap - 2 x daily - 7 x weekly - 1 sets - 3 reps - 30 sec hold  Squat with Chair Touch and Resistance Loop - 2 x daily - 7 x weekly - 3 sets - 10 reps  hip adductor stretch with foot on step - 2 x daily - 7 x weekly - 1 sets - 3 reps - 30 sec hold  Standing Eccentric Heel Raise - 2 x daily - 7 x weekly - 3 sets - 10 reps  Standing Repeated Hip Flexion with Resistance - 2 x daily - 7 x weekly - 2 sets - 10 reps  Standing Repeated Hip Extension with Resistance - 1 x daily - 7 x weekly - 3 sets - 10 reps  Standing Hip Abduction with Anchored Resistance - 2 x daily - 7 x weekly - 2 sets - 10 reps  Lunge with Counter Support - 2 x daily - 7 x weekly - 2 sets - 15 reps  Backward Step Down with Unilateral Counter Support - 2 x daily - 7 x weekly - 2 sets - 15 reps  Supine Bridge with Resistance Band - 2 x daily - 7 x weekly - 3 sets - 10 reps  Clamshell with Resistance - 2 x daily - 7 x weekly - 2 sets - 10 reps  Sidelying Hip Abduction - 2 x daily - 7 x weekly - 2 sets - 10 reps  Quadruped Hip Extension Kicks - 2 x daily - 7 x weekly - 2 sets - 10 reps        Comprehension of Education:  [] Verbalizes understanding. [] Demonstrates understanding. [x] Needs review. [] Demonstrates/verbalizes HEP/Ed previously given. Patient Status:     [] Continue per initial plan of care. [x] Additional visits necessary. [] Other:     Requested Frequency/Duration: 1 times per week for 4 treatments. If you have any questions or concerns, please don't hesitate to call. Thank you for your referral.    Physician Signature:________________________________Date:__________________  By signing above or cosigning this note, I have reviewed this plan of care and certify a need for medically necessary rehabilitation services. Plan: [x] Continue per plan of care.  [] Other: pt requests to follow up PRN after [facility]     Time In: 1100  Time Out: 2765    Electronically signed by:  Eugenie Augustin, PT

## 2022-10-17 NOTE — CARE COORDINATION
Medicare Cap   [x] Physical Therapy  [] Speech Therapy  [] Occupational therapy  *PT and Speech caps combine      $2150 Limit for PT and Speech combined  $2150 Limit for OT individually  At the beginning of the month where you expect to go over $2150, please add the [address] [place] [address] modifier      Patient Name: Kristina Powers  YOB: 1950    Note:  This is an estimate of charges billed.      Date of [address] Name # units/ charge $$$ charge Daily Total Charge Ongoing Total $$$   6/6/22 IE, man, carlos 1x1x1  142.19 142.19   6/13/22 carlos 3, man1   96.23 238.42   6/27/22 [place]3, man   93.91 332.33   7/5/22 TE,man,vaso 2,1,1 25.10+19.62+18.34+7.91 70.97 403.30   7/11/22 Carlos,man, vaso 2,1,1  70.97    8/1/22 Carlos, vaso 3,1  82.07 556.34   8/8 Carlos, vaso 3,1  82.07 638.41   8/22 carlos 4  95.37 733.78   9/12 carlos 3  74.16 807.94   10/3 acrlos 4  95.37 903.31   10/17 carlos 2  50.79 954.10

## 2023-01-09 ENCOUNTER — HOSPITAL ENCOUNTER (OUTPATIENT)
Dept: PHYSICAL THERAPY | Facility: CLINIC | Age: 73
Setting detail: THERAPIES SERIES
Discharge: HOME | End: 2023-01-09
Payer: MEDICARE

## 2023-01-09 PROCEDURE — 97110 THERAPEUTIC EXERCISES: CPT

## 2023-01-09 NOTE — FLOWSHEET NOTE
[x] [facility]    [address], [place]    Phone: [phone]    Fax:  [phone]       Physical Therapy Daily Treatment Note    Date:  2023  Patient Name:  Sussy Swanson    :  1950  MRN: [id number]  Physician: Dr. Marie Toro: Medicare  Medical Diagnosis:   R achilles tendonitis  Rehab Codes:  M49.697  Onset date:    22                                   Next Dr's appt.: none  Visit# / total visits:    Cancels/No Shows: 0/0  Next goal race: [employer] 10k    Subjective:   Pain:  [x] Yes  [] No Location: R achilles  Pain Rating: (0-10 scale) 0/10  Comments:  He reports only minimal issues with R achilles. Nov and Dec had minimal HEP and running due to holidays and travel. He reports radha anterior knee pain when ascending stairs and a little bit when he runs, but doesn't feel it the longer he runs. Hasn't needed to wear his night splint because he \"doesn't need it. \"    Objective:  Modalities: Game Ready PRN  Precautions: standard  Exercises:  Exercise Reps/ Time Weight/ Level   Comments   Supine    Bilateral   Quad sets 10x10\"  x    SAQ 20 5 lbs x    SLR 15  x    1 legged bridges 10  x    David stretch 3x30\"  x    Prone       Prone quad stretch 3x30\"  x    Hip ext 10  x    Gym       Chair squats 20 20 lbs     Split squats 2x15      TKE 20x3\" purple x    Heel taps 15 6\"     4 way hip 15 purple  Issued purple TB   Hip adductor stretch 3x30\"  8\"      Posterior lunges in //bars 15        Eccentric calf raises 2x15 15 lbs     BAPS 3x10 L2   pt requests L3 next visit   Monster walks 4x20' black   issued black for HEP   step ups 15 8\"  Posterior and lateral    Other:    Specific Instructions for next treatment:  1.  continue to encourage strengthening program    Treatment Charges: Mins Units   []  Phys perf test     [x]  Ther Exercise 55 4   []  Manual Therapy     []  Ther Activities     []  NMR     [] Vasocompression     []  Other     Total Treatment time 55 4       Assessment: [x] Progressing toward goals. SLR on L reproduced his L anterior knee pain and he noted that it was weaker than the R.  Modified program to work on basic level quad and glut max strength. Knee flexion bilaterally is limited. [] No change. [] Other:    Goals:  STG: (to be met in 5 treatments)  ? Pain: <3/10 at all times so that he can continue to run.  ? ROM: with DF and all hip movements  ? Strength: eccentrically with calf and hip ext and abd  ? Function: able to race 5k with <3/10 pain  LEFI score to 80/80  Independent with Home Exercise Programs     LTG: (to be met in 10 treatments)  No pain in R achilles  MMT with >4/5   Able to run without R achilles pain                 Patient goals:\"be able to continue running\"    Pt. Education:  [x] Yes  [] No  [x] Reviewed Prior HEP/Ed   Method of Education  [x] Verbal   To consider continuing with yoga at the end of PT  Encouraged him use ice pack 3x per day. Even recommended Freeze Sleeve  Provided link on Amazon for stretch strap  Reviewed purchase of a dorsal night splint to maintain neutral foot/ankle position. [x] Demo  [x] Written  Access Code: [email or web address]  URL: [email or web address]  Date: 10/03/2022  Prepared by: Felicitas Aleman    Exercises  Standing Shoulder Row with Anchored Resistance - 1 x daily - 4 x weekly - 2 sets - 15 reps  Shoulder Extension with Resistance - 1 x daily - 4 x weekly - 2 sets - 15 reps  CLX Shoulder Overhead Press with Hand Closed - 1 x daily - 4 x weekly - 2 sets - 15 reps  Standing Bicep Curls with Resistance - 1 x daily - 4 x weekly - 2 sets - 15 reps  Standing Elbow Extension with Anchored Resistance - 1 x daily - 4 x weekly - 2 sets - 15 reps  Standing Shoulder Horizontal Abduction with Resistance - 1 x daily - 4 x weekly - 2 sets - 15 reps    Access Code: [email or web address]  URL: [email or web address]  Date: 01/09/2023  Prepared by: Alfonzo Barry Derik Grad    Exercises  Side Stepping with Resistance at Feet - 2 x daily - 5 x weekly - 4 sets - 10-15 reps  Split Squat Lunge - 2 x daily - 5 x weekly - 2 sets - 15-20 reps  Side Step Down with Counter Support - 2 x daily - 7 x weekly - 2 sets - 15 reps  Squat with Chair Touch and Resistance Loop - 2 x daily - 7 x weekly - 3 sets - 10 reps  hip adductor stretch with foot on step - 2 x daily - 7 x weekly - 1 sets - 3 reps - 30 sec hold  Standing Eccentric Heel Raise - 2 x daily - 7 x weekly - 3 sets - 10 reps  Standing Repeated Hip Flexion with Resistance - 2 x daily - 7 x weekly - 2 sets - 10 reps  Standing Repeated Hip Extension with Resistance - 1 x daily - 7 x weekly - 3 sets - 10 reps  Standing Hip Abduction with Anchored Resistance - 2 x daily - 7 x weekly - 2 sets - 10 reps  Lunge with Counter Support - 2 x daily - 7 x weekly - 2 sets - 15 reps  Backward Step Down with Unilateral Counter Support - 2 x daily - 7 x weekly - 2 sets - 15 reps  Prone Quadriceps Stretch with Strap - 2 x daily - 7 x weekly - 1 sets - 3 reps - 30 sec hold  Clamshell with Resistance - 2 x daily - 7 x weekly - 2 sets - 10 reps  Sidelying Hip Abduction - 2 x daily - 7 x weekly - 2 sets - 10 reps  Single Leg Bridge - 2 x daily - 7 x weekly - 2 sets - 10 reps  Prone hip extension w/ 2 pillows - 2 x daily - 7 x weekly - 2 sets - 10 reps  David Stretch on Table - 2 x daily - 7 x weekly - 1 sets - 3 reps - 30 sec hold    Comprehension of Education:  [] Verbalizes understanding. [] Demonstrates understanding. [x] Needs review. [] Demonstrates/verbalizes HEP/Ed previously given. Plan: [x] Continue per plan of care.  Pt requests to return in 3 wks   [] Other:     Time In: 0900  Time Out: 1002    Electronically signed by:  Kali Kelley, PT

## 2023-01-09 NOTE — CARE COORDINATION
Medicare Cap   [x] Physical Therapy  [] Speech Therapy  [] Occupational therapy  *PT and Speech caps combine      $2150 Limit for PT and Speech combined  $2150 Limit for OT individually  At the beginning of the month where you expect to go over $2150, please add the [address] [place] [address] modifier      Patient Name: Emily Hall  YOB: 1950    Note:  This is an estimate of charges billed.      Date of [address] Name # units/ charge $$$ charge Daily Total Charge Ongoing Total $$$   6/6/22 IE, man, carlos 1x1x1  142.19 142.19   6/13/22 carlos 3, man1   96.23 238.42   6/27/22 Tex3, man   93.91 332.33   7/5/22 TE,man,vaso 2,1,1 25.10+19.62+18.34+7.91 70.97 403.30   7/11/22 Carlos,man, vaso 2,1,1  70.97    8/1/22 Carlos, vaso 3,1  82.07 556.34   8/8 Carlos, vaso 3,1  82.07 638.41   8/22 carlos 4  95.37 733.78   9/12 carlos 3  74.16 807.94   10/3 carlos 4  95.37 903.31   10/17 carlos 2  50.79 954.10   2023 1/9 carlos 4  95.37 95.37

## 2023-01-30 ENCOUNTER — HOSPITAL ENCOUNTER (OUTPATIENT)
Dept: PHYSICAL THERAPY | Facility: CLINIC | Age: 73
Setting detail: THERAPIES SERIES
Discharge: HOME | End: 2023-01-30
Payer: MEDICARE

## 2023-01-30 PROCEDURE — 97110 THERAPEUTIC EXERCISES: CPT

## 2023-01-30 NOTE — FLOWSHEET NOTE
[x] [facility]    [address], [place]    Phone: [phone]    Fax:  [phone]       Physical Therapy Daily Treatment Note    Date:  2023  Patient Name:  Maximino Reeder    :  1950  MRN: [id number]  Physician: Dr. Willett Angle: Medicare  Medical Diagnosis:   R achilles tendonitis  Rehab Codes:  J33.396  Onset date:    22                                   Next Dr's appt.: none  Visit# / total visits:    Cancels/No Shows: 0/0  Next goal race:      Subjective:   Pain:  [x] Yes  [] No Location: R achilles  Pain Rating: (0-10 scale) 0/10  Comments:  He reports only minimal issues with R achilles. During the [employer] 10k, he could feel that his R hamstring was working. HEP consistency has improved. He continues to report radha anterior knee pain when ascending stairs but not when  he runs. As for goals for today, he wishes to work on strengthening for the legs. Objective:  Modalities: Game Ready PRN  Precautions: standard  Exercises:  Exercise Reps/ Time Weight/ Level   Comments   Supine    Bilateral   3 way ankle 15x blue x Df, inversion, eversion   Quad sets 10x10\"      SAQ 20 5 lbs     SLR 15      1 legged bridges 10      David stretch 3x30\"      Prone       Prone quad stretch 3x30\"      Hip ext 10      Gym       PM leg press 3x10 90/105/  120 lbs X 2 legged.     Chair squats 20 20 lbs     Split squats 2x15      TKE 20x3\" purple     Heel taps 15 6\"     4 way hip 15 purple  Issued purple TB   Hip adductor stretch 3x30\"  8\"      Posterior lunges in //bars 15        Eccentric calf raises 2x15 15 lbs     BAPS 3x10 L2 X radha   Monster walks 4x20' black   issued black for HEP   step ups 2x10 ea 8\" x Posterior and lateral, radha   Other:    Specific Instructions for next treatment:  1.  continue to encourage strengthening program    Treatment Charges: Mins Units   []  Phys perf test     [x]  Ther Exercise 55 4   []  Manual Therapy     []  Ther Activities     []  NMR     []  Vasocompression     []  Other     Total Treatment time 55 4       Assessment: [x] Progressing toward goals. Functionally, he is weaker on the L than the R. Added ankle open chain ex to program.   No pain with today's program.       [] No change. [] Other:    Goals:  STG: (to be met in 5 treatments)  ? Pain: <3/10 at all times so that he can continue to run.  ? ROM: with DF and all hip movements  ? Strength: eccentrically with calf and hip ext and abd  ? Function: able to race 5k with <3/10 pain  LEFI score to 80/80  Independent with Home Exercise Programs     LTG: (to be met in 10 treatments)  No pain in R achilles  MMT with >4/5   Able to run without R achilles pain                 Patient goals:\"be able to continue running\"    Pt. Education:  [x] Yes  [] No  [x] Reviewed Prior HEP/Ed   Method of Education  [x] Verbal   To consider continuing with yoga at the end of PT  Encouraged him use ice pack 3x per day. Even recommended Freeze Sleeve  Provided link on Amazon for stretch strap  Reviewed purchase of a dorsal night splint to maintain neutral foot/ankle position. [x] Demo  [x] Written  Access Code: [email or web address]  URL: [email or web address]  Date: 10/03/2022  Prepared by: Wendy Layne    Exercises  Standing Shoulder Row with Anchored Resistance - 1 x daily - 4 x weekly - 2 sets - 15 reps  Shoulder Extension with Resistance - 1 x daily - 4 x weekly - 2 sets - 15 reps  CLX Shoulder Overhead Press with Hand Closed - 1 x daily - 4 x weekly - 2 sets - 15 reps  Standing Bicep Curls with Resistance - 1 x daily - 4 x weekly - 2 sets - 15 reps  Standing Elbow Extension with Anchored Resistance - 1 x daily - 4 x weekly - 2 sets - 15 reps  Standing Shoulder Horizontal Abduction with Resistance - 1 x daily - 4 x weekly - 2 sets - 15 reps    Access Code: [email or web address]  URL: [email or web address]  Date: 01/09/2023  Prepared by: Donnell Resendez    Exercises  Side Stepping with Resistance at Feet - 2 x daily - 5 x weekly - 4 sets - 10-15 reps  Split Squat Lunge - 2 x daily - 5 x weekly - 2 sets - 15-20 reps  Side Step Down with Counter Support - 2 x daily - 7 x weekly - 2 sets - 15 reps  Squat with Chair Touch and Resistance Loop - 2 x daily - 7 x weekly - 3 sets - 10 reps  hip adductor stretch with foot on step - 2 x daily - 7 x weekly - 1 sets - 3 reps - 30 sec hold  Standing Eccentric Heel Raise - 2 x daily - 7 x weekly - 3 sets - 10 reps  Standing Repeated Hip Flexion with Resistance - 2 x daily - 7 x weekly - 2 sets - 10 reps  Standing Repeated Hip Extension with Resistance - 1 x daily - 7 x weekly - 3 sets - 10 reps  Standing Hip Abduction with Anchored Resistance - 2 x daily - 7 x weekly - 2 sets - 10 reps  Lunge with Counter Support - 2 x daily - 7 x weekly - 2 sets - 15 reps  Backward Step Down with Unilateral Counter Support - 2 x daily - 7 x weekly - 2 sets - 15 reps  Prone Quadriceps Stretch with Strap - 2 x daily - 7 x weekly - 1 sets - 3 reps - 30 sec hold  Clamshell with Resistance - 2 x daily - 7 x weekly - 2 sets - 10 reps  Sidelying Hip Abduction - 2 x daily - 7 x weekly - 2 sets - 10 reps  Single Leg Bridge - 2 x daily - 7 x weekly - 2 sets - 10 reps  Prone hip extension w/ 2 pillows - 2 x daily - 7 x weekly - 2 sets - 10 reps  David Stretch on Table - 2 x daily - 7 x weekly - 1 sets - 3 reps - 30 sec hold    Comprehension of Education:  [] Verbalizes understanding. [] Demonstrates understanding. [x] Needs review. [] Demonstrates/verbalizes HEP/Ed previously given. Plan: [x] Continue per plan of care. Pt requests to return as needed.      [] Other:     Time In: 1001  Time Out: 1100    Electronically signed by:  Donnell Resendez, PT

## 2023-01-30 NOTE — CARE COORDINATION
Medicare Cap   [x] Physical Therapy  [] Speech Therapy  [] Occupational therapy  *PT and Speech caps combine      $2150 Limit for PT and Speech combined  $2150 Limit for OT individually  At the beginning of the month where you expect to go over $2150, please add the [address] [place] [address] modifier      Patient Name: Emily Hall  YOB: 1950    Note:  This is an estimate of charges billed.      Date of [address] Name # units/ charge $$$ charge Daily Total Charge Ongoing Total $$$   6/6/22 IE, man, carlos 1x1x1  142.19 142.19   6/13/22 carlos 3, man1   96.23 238.42   6/27/22 [place], man   93.91 332.33   7/5/22 TE,man,vaso 2,1,1 25.10+19.62+18.34+7.91 70.97 403.30   7/11/22 Carlos,man, vaso 2,1,1  70.97    8/1/22 Carlos, vaso 3,1  82.07 556.34   8/8 Carlos, vaso 3,1  82.07 638.41   8/22 carlos 4  95.37 733.78   9/12 carlos 3  74.16 807.94   10/3 carlos 4  95.37 903.31   10/17 carlos 2  50.79 954.10   2023 1/9 carlos 4  95.37 95.37   1/30 carlos 4  95.37 190.74

## 2023-08-04 ENCOUNTER — OFFICE VISIT (OUTPATIENT)
Dept: PULMONOLOGY | Age: 73
End: 2023-08-04
Payer: MEDICARE

## 2023-08-04 VITALS
SYSTOLIC BLOOD PRESSURE: 137 MMHG | HEART RATE: 59 BPM | DIASTOLIC BLOOD PRESSURE: 79 MMHG | BODY MASS INDEX: 23.99 KG/M2 | HEIGHT: 76 IN | TEMPERATURE: 98 F | WEIGHT: 197 LBS | RESPIRATION RATE: 12 BRPM | OXYGEN SATURATION: 98 %

## 2023-08-04 DIAGNOSIS — J45.990 EXERCISE-INDUCED ASTHMA: ICD-10-CM

## 2023-08-04 DIAGNOSIS — J45.30 MILD PERSISTENT ASTHMA WITHOUT COMPLICATION: Primary | ICD-10-CM

## 2023-08-04 DIAGNOSIS — J30.89 PERENNIAL ALLERGIC RHINITIS: ICD-10-CM

## 2023-08-04 PROCEDURE — G8420 CALC BMI NORM PARAMETERS: HCPCS | Performed by: INTERNAL MEDICINE

## 2023-08-04 PROCEDURE — 1036F TOBACCO NON-USER: CPT | Performed by: INTERNAL MEDICINE

## 2023-08-04 PROCEDURE — G8427 DOCREV CUR MEDS BY ELIG CLIN: HCPCS | Performed by: INTERNAL MEDICINE

## 2023-08-04 PROCEDURE — 1123F ACP DISCUSS/DSCN MKR DOCD: CPT | Performed by: INTERNAL MEDICINE

## 2023-08-04 PROCEDURE — 99213 OFFICE O/P EST LOW 20 MIN: CPT | Performed by: INTERNAL MEDICINE

## 2023-08-04 PROCEDURE — 3017F COLORECTAL CA SCREEN DOC REV: CPT | Performed by: INTERNAL MEDICINE

## 2023-08-04 RX ORDER — ALBUTEROL SULFATE 90 UG/1
2 AEROSOL, METERED RESPIRATORY (INHALATION) EVERY 6 HOURS PRN
Qty: 1 EACH | Refills: 11 | Status: SHIPPED | OUTPATIENT
Start: 2023-08-04

## 2023-08-04 ASSESSMENT — ENCOUNTER SYMPTOMS
EYES NEGATIVE: 1
COUGH: 1

## 2023-08-07 ENCOUNTER — TELEPHONE (OUTPATIENT)
Dept: PULMONOLOGY | Age: 73
End: 2023-08-07

## 2023-08-07 DIAGNOSIS — J45.30 MILD PERSISTENT ASTHMA WITHOUT COMPLICATION: Primary | ICD-10-CM

## 2023-08-07 RX ORDER — BUDESONIDE AND FORMOTEROL FUMARATE DIHYDRATE 80; 4.5 UG/1; UG/1
2 AEROSOL RESPIRATORY (INHALATION) 2 TIMES DAILY
Qty: 1 EACH | Refills: 11 | Status: SHIPPED | OUTPATIENT
Start: 2023-08-07

## 2023-08-07 NOTE — TELEPHONE ENCOUNTER
Outcome  Deniedtoday  The drug you asked for is not listed in your preferred drug list (formulary). The preferred drug(s), you may not have tried, are: Flovent Diskus powder for inhalation, Flovent HFA aerosol inhaler, and Symbicort HFA aerosol inhaler. Your provider needs to give us medical reasons why the preferred drug(s) would not work for you and/or would have bad side effects. Sometimes a preferred drug needs more review for approval. Additionally, some preferred drugs listed may be the same drugs with different strengths or forms. [employer] may only require one strength or form of that drug to be tried. This decision was from [facility] Non-Formulary Exceptions Coverage Policy.

## 2023-08-07 NOTE — TELEPHONE ENCOUNTER
Dr. Mando Vitale has been DENIED because patient has not tried formularies that are covered. These are the inhalers that are on formulary for the insurance company. Symbicort, Flovent HFA, Flovent Diskus, Advair HFA or Advair Diskus.     Please place and order or advise if you would like to appeal.

## 2023-08-08 NOTE — TELEPHONE ENCOUNTER
CALLED [facility] informed Susan Ruiz to Leno Jo because insurance will not cover and we have sent in Symbicort. LMOM for pt to call me back so patient can be informed of the change.

## 2023-08-08 NOTE — TELEPHONE ENCOUNTER
SYMBICORT - Approved  PA Detail   Prior authorization approved Case ID: Tushar Leon - Medicare    [id number]    PA Case: [id number], Status: Approved, Coverage Starts on: 8/8/2023 7:58:02 AM, Coverage Ends on: 8/8/2023 7:58:02 AM. Questions? Contact [phone].      Approval Details    Authorized from August 8, 2023 to August 8, 2023      Electronic appeal:  Not supported   View History

## 2023-10-07 DIAGNOSIS — J45.30 MILD PERSISTENT ASTHMA WITHOUT COMPLICATION: ICD-10-CM

## 2023-10-09 ENCOUNTER — APPOINTMENT (OUTPATIENT)
Dept: PHYSICAL THERAPY | Facility: CLINIC | Age: 73
End: 2023-10-09
Payer: MEDICARE

## 2023-10-09 RX ORDER — MONTELUKAST SODIUM 10 MG/1
10 TABLET ORAL NIGHTLY
Qty: 90 TABLET | Refills: 2 | Status: SHIPPED | OUTPATIENT
Start: 2023-10-09

## 2023-10-09 NOTE — TELEPHONE ENCOUNTER
LAST VISIT: 8/4/23  NEXT VISIT: 5/17/24    Per last dictation patient is on this medication. Please sign for refill if ok. Thank you.

## 2023-11-13 ENCOUNTER — HOSPITAL ENCOUNTER (OUTPATIENT)
Dept: PHYSICAL THERAPY | Facility: CLINIC | Age: 73
Setting detail: THERAPIES SERIES
Discharge: HOME | End: 2023-11-13
Payer: MEDICARE

## 2023-11-13 PROCEDURE — 97164 PT RE-EVAL EST PLAN CARE: CPT

## 2023-11-13 PROCEDURE — 97140 MANUAL THERAPY 1/> REGIONS: CPT

## 2023-11-13 PROCEDURE — 97110 THERAPEUTIC EXERCISES: CPT

## 2023-11-13 PROCEDURE — 97161 PT EVAL LOW COMPLEX 20 MIN: CPT

## 2023-11-13 NOTE — CONSULTS
Charges: Mins Units   [x] Evaluation       [x]  Low       []  Moderate       []  High  1   [] Phys perf test     [x]  Ther Exercise 20 1   []  Manual Therapy     []  Ther Activities     []  Aquatics     []  Vasocompression     []  NMR       TOTAL TREATMENT TIME: 50    Time in: 1110  Time Out:1203    Electronically signed by: Saida Cowart PT        Physician Signature:________________________________Date:__________________  By signing above or cosigning this note, I have reviewed this plan of care and certify a need for medically necessary rehabilitation services.      *PLEASE SIGN ABOVE AND FAX BACK ALL PAGES*

## 2023-11-13 NOTE — CARE COORDINATION
Date of [facility] Name # units/ charge $$$ charge Daily Total Charge Ongoing Total $$$   6/6/22 IE, man, carlos 1x1x1   142.19 142.19   6/13/22 carlos 3, man1     96.23 238.42   6/27/22 [place]3, man     93.91 332.33   7/5/22 TE,man,vaso 2,1,1 25.10+19.62+18.34+7.91 70.97 403.30   7/11/22 Carlos,man, vaso 2,1,1   70.97     8/1/22 Carlos, vaso 3,1   82.07 556.34   8/8 Carlos, vaso 3,1   82.07 638.41   8/22 carlos 4   95.37 733.78   9/12 carlos 3   74.16 807.94   10/3 carlos 4   95.37 903.31   10/17 carlos 2   50.79 954.10   2023 1/9 carlos 4   95.37 95.37   1/30 carlos 4   95.37 190.74    4/10  carlos 2   50.79      11/13 Re-eval, carlos, man  66.24+21.75+20.32    108.31  349.84

## 2024-01-26 ENCOUNTER — OFFICE VISIT (OUTPATIENT)
Dept: PULMONOLOGY | Age: 74
End: 2024-01-26
Payer: MEDICARE

## 2024-01-26 VITALS
DIASTOLIC BLOOD PRESSURE: 72 MMHG | WEIGHT: 183.4 LBS | HEIGHT: 76 IN | SYSTOLIC BLOOD PRESSURE: 132 MMHG | HEART RATE: 60 BPM | TEMPERATURE: 98.1 F | RESPIRATION RATE: 13 BRPM | BODY MASS INDEX: 22.33 KG/M2 | OXYGEN SATURATION: 94 %

## 2024-01-26 DIAGNOSIS — J10.1 INFLUENZA A H1N1 INFECTION: ICD-10-CM

## 2024-01-26 DIAGNOSIS — J45.21 MILD INTERMITTENT ASTHMA WITH (ACUTE) EXACERBATION: Primary | ICD-10-CM

## 2024-01-26 PROCEDURE — 1036F TOBACCO NON-USER: CPT | Performed by: INTERNAL MEDICINE

## 2024-01-26 PROCEDURE — G8427 DOCREV CUR MEDS BY ELIG CLIN: HCPCS | Performed by: INTERNAL MEDICINE

## 2024-01-26 PROCEDURE — G8420 CALC BMI NORM PARAMETERS: HCPCS | Performed by: INTERNAL MEDICINE

## 2024-01-26 PROCEDURE — G8484 FLU IMMUNIZE NO ADMIN: HCPCS | Performed by: INTERNAL MEDICINE

## 2024-01-26 PROCEDURE — 1123F ACP DISCUSS/DSCN MKR DOCD: CPT | Performed by: INTERNAL MEDICINE

## 2024-01-26 PROCEDURE — 3017F COLORECTAL CA SCREEN DOC REV: CPT | Performed by: INTERNAL MEDICINE

## 2024-01-26 PROCEDURE — 99213 OFFICE O/P EST LOW 20 MIN: CPT | Performed by: INTERNAL MEDICINE

## 2024-01-26 RX ORDER — OSELTAMIVIR PHOSPHATE 75 MG/1
75 CAPSULE ORAL
COMMUNITY
Start: 2024-01-21

## 2024-01-26 RX ORDER — AZITHROMYCIN 250 MG/1
250 TABLET, FILM COATED ORAL SEE ADMIN INSTRUCTIONS
Qty: 6 TABLET | Refills: 0 | Status: CANCELLED | OUTPATIENT
Start: 2024-01-26 | End: 2024-01-31

## 2024-01-26 RX ORDER — PREDNISONE 10 MG/1
40 TABLET ORAL DAILY
Qty: 20 TABLET | Refills: 0 | Status: SHIPPED | OUTPATIENT
Start: 2024-01-26 | End: 2024-01-31

## 2024-01-26 RX ORDER — PREDNISONE 20 MG/1
TABLET ORAL
COMMUNITY
Start: 2024-01-21 | End: 2024-01-26 | Stop reason: SDUPTHER

## 2024-01-26 ASSESSMENT — ENCOUNTER SYMPTOMS
COUGH: 1
EYES NEGATIVE: 1
WHEEZING: 1
CHEST TIGHTNESS: 1
SHORTNESS OF BREATH: 1

## 2024-01-26 NOTE — PROGRESS NOTES
Subjective:      Patient ID: Hilton Randle is a 73 y.o. male being seen in my clinic for   Chief Complaint   Patient presents with    Asthma     Follow up, sick call; still coughing; went to [facility] [place]       HPI  Sick call visit for increasing respiratory symptoms.  Patient states that about 10 days ago he awoke with congestion which she localizes to the upper part of his chest.  Reported thick, clear sputum.  Some chest tightness as well.  He called his primary care provider who recommended Mucinex and Zithromax..  \"That made it worse.\"  States that secretions became more viscous.  Last weekend he noted fever myalgias and malaise.  He was seen at [facility] urgent care on 1/21/2024.  Chest x-ray report parabronchial thickening in the perihilar areas.  Imaging not available for my review.  Rapid COVID test was negative.  Test for influenza A was positive.  He was treated with Tamiflu, and 4 days of prednisone.  He states the last couple of days he is somewhat better.  Afebrile.  Reports fatigue however.  Still cough and chest tightness.  Did not receive his flu shot.  Uses albuterol (inhaler and nebulizer).  Does not have LABA/ICS due to cost.  Also on montelukast.    Review of Systems   Constitutional:  Positive for fatigue.   HENT: Negative.     Eyes: Negative.    Respiratory:  Positive for cough, chest tightness, shortness of breath and wheezing.    Cardiovascular: Negative.    All other systems reviewed and are negative.      Objective:     Vitals:    01/26/24 1321   BP: 132/72   Site: Left Upper Arm   Position: Sitting   Cuff Size: Large Adult   Pulse: 60   Resp: 13   Temp: 98.1 °F (36.7 °C)   TempSrc: Temporal   SpO2: 94%   Weight: 83.2 kg (183 lb 6.4 oz)   Height: 1.93 m (6' 4\")     Current Outpatient Medications   Medication Sig Dispense Refill    predniSONE (DELTASONE) 10 MG tablet Take 4 tablets by mouth daily for 5 days 20 tablet 0    oseltamivir (TAMIFLU) 75 MG capsule Take 1

## 2024-01-29 ENCOUNTER — TELEPHONE (OUTPATIENT)
Dept: PULMONOLOGY | Age: 74
End: 2024-01-29

## 2024-01-29 NOTE — TELEPHONE ENCOUNTER
I presented that route first but patient wanted your recommendations on one. Writer will inform the patient.

## 2024-01-29 NOTE — TELEPHONE ENCOUNTER
Patient called, states the Dulera inhaler is too costly. He knows we can't give him prices but is there a recommendation on the other inhalers in the same class that he can inquire about to his insurance company?

## 2024-01-29 NOTE — TELEPHONE ENCOUNTER
Kin Marquez, DO  You30 minutes ago (3:58 PM)       Would go back to pharmacist and pose same question, since he/she can actually see price that he will pay, and ask which one is least costly for him.  Let us know which one and I will order.

## 2024-02-02 DIAGNOSIS — J45.21 MILD INTERMITTENT ASTHMA WITH (ACUTE) EXACERBATION: Primary | ICD-10-CM

## 2024-02-02 RX ORDER — ALBUTEROL SULFATE 2.5 MG/3ML
2.5 SOLUTION RESPIRATORY (INHALATION) 4 TIMES DAILY PRN
Qty: 120 EACH | Refills: 11 | Status: SHIPPED | OUTPATIENT
Start: 2024-02-02

## 2024-02-02 NOTE — TELEPHONE ENCOUNTER
Pt requested a refill on albuterol for his nebulizer. Rx pending for your review. Please sign if you agree.

## 2024-02-05 ENCOUNTER — TELEPHONE (OUTPATIENT)
Dept: PULMONOLOGY | Age: 74
End: 2024-02-05

## 2024-02-05 NOTE — TELEPHONE ENCOUNTER
CALLED NANMARIE JONES SPOKE WITH Aayush informed her that this needs billed under Medicare Part \"B\".  She will call the pt to obtain that information

## 2024-03-18 DIAGNOSIS — J45.21 MILD INTERMITTENT ASTHMA WITH (ACUTE) EXACERBATION: ICD-10-CM

## 2024-03-18 RX ORDER — AZITHROMYCIN 250 MG/1
TABLET, FILM COATED ORAL
Qty: 6 TABLET | Refills: 0 | Status: SHIPPED | OUTPATIENT
Start: 2024-03-18 | End: 2024-03-28

## 2024-03-18 RX ORDER — PREDNISONE 10 MG/1
40 TABLET ORAL DAILY
Qty: 20 TABLET | Refills: 0 | Status: SHIPPED | OUTPATIENT
Start: 2024-03-18 | End: 2024-03-23

## 2024-03-18 NOTE — TELEPHONE ENCOUNTER
Patient called in with complaint of Bronchial infection- raspy, cough, wheezing. He is requesting same steroid as he had in January.  Reordered Prednisone and Zpak for patient.  He is going out of town tomorrow.  Please review and sign if accepted. Patient is up to date on appointments. Thank you

## 2024-03-28 ENCOUNTER — APPOINTMENT (OUTPATIENT)
Dept: VASCULAR LAB | Age: 74
End: 2024-03-28
Payer: MEDICARE

## 2024-03-28 ENCOUNTER — APPOINTMENT (OUTPATIENT)
Dept: CT IMAGING | Age: 74
End: 2024-03-28
Payer: MEDICARE

## 2024-03-28 ENCOUNTER — HOSPITAL ENCOUNTER (EMERGENCY)
Age: 74
Discharge: HOME | End: 2024-03-28
Attending: STUDENT IN AN ORGANIZED HEALTH CARE EDUCATION/TRAINING PROGRAM
Payer: MEDICARE

## 2024-03-28 VITALS
SYSTOLIC BLOOD PRESSURE: 151 MMHG | OXYGEN SATURATION: 97 % | RESPIRATION RATE: 16 BRPM | WEIGHT: 190 LBS | HEART RATE: 65 BPM | BODY MASS INDEX: 23.13 KG/M2 | TEMPERATURE: 96.8 F | DIASTOLIC BLOOD PRESSURE: 79 MMHG

## 2024-03-28 DIAGNOSIS — R06.02 SHORTNESS OF BREATH: ICD-10-CM

## 2024-03-28 DIAGNOSIS — M79.605 LEFT LEG PAIN: Primary | ICD-10-CM

## 2024-03-28 DIAGNOSIS — I26.93 SINGLE SUBSEGMENTAL PULMONARY EMBOLISM WITHOUT ACUTE COR PULMONALE (HCC): ICD-10-CM

## 2024-03-28 LAB
ALBUMIN SERPL-MCNC: 4.3 G/DL (ref 3.5–5.2)
ALBUMIN/GLOB SERPL: 2 {RATIO} (ref 1–2.5)
ALP SERPL-CCNC: 41 U/L (ref 40–129)
ALT SERPL-CCNC: 18 U/L (ref 10–50)
ANION GAP SERPL CALCULATED.3IONS-SCNC: 12 MMOL/L (ref 9–16)
AST SERPL-CCNC: 24 U/L (ref 10–50)
BASOPHILS # BLD: 0.03 K/UL (ref 0–0.2)
BASOPHILS NFR BLD: 1 % (ref 0–2)
BILIRUB SERPL-MCNC: 0.5 MG/DL (ref 0–1.2)
BUN SERPL-MCNC: 25 MG/DL (ref 8–23)
CALCIUM SERPL-MCNC: 9.4 MG/DL (ref 8.6–10.4)
CHLORIDE SERPL-SCNC: 104 MMOL/L (ref 98–107)
CO2 SERPL-SCNC: 22 MMOL/L (ref 20–31)
CREAT SERPL-MCNC: 1.2 MG/DL (ref 0.7–1.2)
D DIMER PPP FEU-MCNC: 1.97 UG/ML FEU (ref 0–0.57)
EOSINOPHIL # BLD: 0.24 K/UL (ref 0–0.44)
EOSINOPHILS RELATIVE PERCENT: 5 % (ref 1–4)
ERYTHROCYTE [DISTWIDTH] IN BLOOD BY AUTOMATED COUNT: 13.6 % (ref 11.8–14.4)
GFR SERPL CREATININE-BSD FRML MDRD: 63 ML/MIN/1.73M2
GLUCOSE SERPL-MCNC: 99 MG/DL (ref 74–99)
HCT VFR BLD AUTO: 41.5 % (ref 40.7–50.3)
HGB BLD-MCNC: 14 G/DL (ref 13–17)
IMM GRANULOCYTES # BLD AUTO: 0.04 K/UL (ref 0–0.3)
IMM GRANULOCYTES NFR BLD: 1 %
LYMPHOCYTES NFR BLD: 1.21 K/UL (ref 1.1–3.7)
LYMPHOCYTES RELATIVE PERCENT: 24 % (ref 24–43)
MCH RBC QN AUTO: 31.3 PG (ref 25.2–33.5)
MCHC RBC AUTO-ENTMCNC: 33.7 G/DL (ref 28.4–34.8)
MCV RBC AUTO: 92.6 FL (ref 82.6–102.9)
MONOCYTES NFR BLD: 0.46 K/UL (ref 0.1–1.2)
MONOCYTES NFR BLD: 9 % (ref 3–12)
NEUTROPHILS NFR BLD: 61 % (ref 36–65)
NEUTS SEG NFR BLD: 3.08 K/UL (ref 1.5–8.1)
NRBC BLD-RTO: 0 PER 100 WBC
PLATELET # BLD AUTO: ABNORMAL K/UL (ref 138–453)
PLATELET, FLUORESCENCE: ABNORMAL K/UL (ref 138–453)
POTASSIUM SERPL-SCNC: 4.2 MMOL/L (ref 3.7–5.3)
PROT SERPL-MCNC: 7 G/DL (ref 6.6–8.7)
RBC # BLD AUTO: 4.48 M/UL (ref 4.21–5.77)
SODIUM SERPL-SCNC: 138 MMOL/L (ref 136–145)
TROPONIN I SERPL HS-MCNC: 12 NG/L (ref 0–22)
WBC OTHER # BLD: 5.1 K/UL (ref 3.5–11.3)

## 2024-03-28 PROCEDURE — 99285 EMERGENCY DEPT VISIT HI MDM: CPT

## 2024-03-28 PROCEDURE — 6360000004 HC RX CONTRAST MEDICATION: Performed by: PEDIATRICS

## 2024-03-28 PROCEDURE — 85055 RETICULATED PLATELET ASSAY: CPT

## 2024-03-28 PROCEDURE — 99284 EMERGENCY DEPT VISIT MOD MDM: CPT | Performed by: STUDENT IN AN ORGANIZED HEALTH CARE EDUCATION/TRAINING PROGRAM

## 2024-03-28 PROCEDURE — 85379 FIBRIN DEGRADATION QUANT: CPT

## 2024-03-28 PROCEDURE — 84484 ASSAY OF TROPONIN QUANT: CPT

## 2024-03-28 PROCEDURE — 85025 COMPLETE CBC W/AUTO DIFF WBC: CPT

## 2024-03-28 PROCEDURE — 80053 COMPREHEN METABOLIC PANEL: CPT

## 2024-03-28 PROCEDURE — 71260 CT THORAX DX C+: CPT

## 2024-03-28 PROCEDURE — 93970 EXTREMITY STUDY: CPT

## 2024-03-28 PROCEDURE — 93005 ELECTROCARDIOGRAM TRACING: CPT | Performed by: PEDIATRICS

## 2024-03-28 RX ADMIN — IOPAMIDOL 75 ML: 755 INJECTION, SOLUTION INTRAVENOUS at 14:51

## 2024-03-28 ASSESSMENT — PAIN - FUNCTIONAL ASSESSMENT
PAIN_FUNCTIONAL_ASSESSMENT: NONE - DENIES PAIN
PAIN_FUNCTIONAL_ASSESSMENT: 0-10

## 2024-03-28 ASSESSMENT — ENCOUNTER SYMPTOMS
ALLERGIC/IMMUNOLOGIC NEGATIVE: 1
EYES NEGATIVE: 1
SHORTNESS OF BREATH: 0
SHORTNESS OF BREATH: 1
RESPIRATORY NEGATIVE: 1
GASTROINTESTINAL NEGATIVE: 1
BACK PAIN: 0

## 2024-03-28 ASSESSMENT — LIFESTYLE VARIABLES
HOW OFTEN DO YOU HAVE A DRINK CONTAINING ALCOHOL: NEVER
HOW MANY STANDARD DRINKS CONTAINING ALCOHOL DO YOU HAVE ON A TYPICAL DAY: PATIENT DOES NOT DRINK

## 2024-03-28 ASSESSMENT — PAIN SCALES - GENERAL: PAINLEVEL_OUTOF10: 2

## 2024-03-28 NOTE — ED TRIAGE NOTES
Pt presents to the ER with left calf pain that presented after doing a 5K. Pt has factor V leiden and has had a blood clot a long time ago. Pt feels a vein that is swollen in his left calf.   Pt states he is also recovering from bronchitis and has just taken the last abx. Pt does report lots of sputum and some shortness of breath.   Pt denies any chest pain

## 2024-03-28 NOTE — ED PROVIDER NOTES
[facility] ED  Emergency Department  Faculty Attestation       I performed a history and physical examination of the patient and discussed management with the resident. I reviewed the resident’s note and agree with the documented findings including all diagnostic interpretations and plan of care. Any areas of disagreement are noted on the chart. I was personally present for the key portions of any procedures. I have documented in the chart those procedures where I was not present during the key portions. I have reviewed the emergency nurses triage note. I agree with the chief complaint, past medical history, past surgical history, allergies, medications, social and family history as documented unless otherwise noted below. Documentation of the HPI, Physical Exam and Medical Decision Making performed by jeanibivy is based on my personal performance of the HPI, PE and MDM. For Physician Assistant/ Nurse Practitioner cases/documentation I have personally evaluated this patient and have completed at least one if not all key elements of the E/M (history, physical exam, and MDM). Additional findings are as noted.    Pertinent Comments     Primary Care Physician: Kin Hess MD    History: This is a 73 y.o. male who presents to the Emergency Department with complaint of    Chief Complaint   Patient presents with    Leg Pain     Left leg, poss blood clot         Physical:    ED Triage Vitals [03/28/24 1338]   BP Temp Temp Source Pulse Respirations SpO2 Height Weight - Scale   (!) 143/78 96.8 °F (36 °C) Oral 58 14 99 % -- 86.2 kg (190 lb)        RADIOLOGY:  No results found.    MDM/Plan:   Left lower leg swelling.  Palpable cord present.  Patient has a history of factor V Leiden deficiency.  Also has a history of some ongoing shortness of breath for the past month or so.  He believes that he has a \"bronchial infection\".  Concern for possible PE.  Vital signs are stable thankfully.  Not on

## 2024-03-28 NOTE — ED PROVIDER NOTES
This patient signed out to me by  at the completion of his shift.  This patient presented to the emergency department with complaints of pain in the left calf after having completed a 5K run.  No chest pain or shortness of breath.  He did does have a history of factor V Leiden and a previous thromboembolic history although the patient is currently not anticoagulated.   I reviewed the patient's lab results.  No clinically significant abnormalities are noted.  Venous Doppler studies are pending.  Disposition is yet to be determined.     Nick Villarreal MD  03/28/24 1540    I reviewed the results of the CT PA and there are there is evidence of a nonocclusive left upper lobe thrombus.  Venous Doppler studies were negative for obvious thromboembolic disease.  Impression nonocclusive PE.  Plan: We will consult vascular surgeons and speak with the pulmonology.         Nick Villarreal MD  03/28/24 1637      On examination the patient has some mild swelling and palpable superficial firm varicosities in the left proximal medial calf.  Lungs are clear bilaterally good air entry throughout.  Cardiac exam demonstrates an S1-S2, regular rhythm.  No murmurs, rubs, gallop.  Patient pulses in the 70s during my examination.  Vital signs are stable.     Nick Villarreal MD  03/28/24 1656       Nick Villarreal MD  03/28/24 1700      Patient is been evaluated by the vascular surgeons and they have indicated the patient may be discharged with prescription for an oral anticoagulant, specifically Eliquis.  I explained to the patient the plans on the part of vascular surgeons.  I also indicated that I had unfortunately been unable to speak with Dr. Marquez.  Patient was instructed to follow-up with his primary care provider and also with the vascular surgeons.  He is asked to fill this prescription and take his medications as directed.  He is cautioned against any trauma or accidents.     Nick Villarreal MD  03/28/24  1835

## 2024-03-28 NOTE — ED PROVIDER NOTES
[facility] ED  Emergency Department Encounter  Emergency Medicine Resident     Pt Name:Hilton Randle  MRN: [id number]  Birthdate 1950  Date of evaluation: 3/28/24  PCP:  Kin Hess MD    3:50 PM EDT     CHIEF COMPLAINT       Chief Complaint   Patient presents with    Leg Pain     Left leg, poss blood clot       HISTORY OF PRESENT ILLNESS  (Location/Symptom, Timing/Onset, Context/Setting, Quality, Duration, Modifying Factors, Severity.)      Hilton Randle is a 73 y.o. male who presents with patient here for leg swelling on his left lower extremity does have a history of factor V Leiden disease and was on anticoagulation approximately 20+ years ago.  He is here for concern for blood clot he also states that he feels like he can feel a blood clot in his left lower extremity superficially in the popliteal area.  He states that he has been having bronchitis with shortness of breath over the past 1 to 2 weeks however unimproved from steroids antibiotics and nebulizer treatments.  He states that he recently ran a 5K a week or so ago and has since not been feeling like he could not catch his breath completely even at rest.  He is here today with concern for blood clots.  He does follow outpatient with pulmonology.    PAST MEDICAL / SURGICAL / SOCIAL / FAMILY HISTORY      has a past medical history of Abnormal EKG, Acquired hypothyroidism, Acute conjunctivitis of both eyes, Acute pain of right knee, Anxiety, Asthma, Benign prostatic hyperplasia, Cough, Deep vein thrombosis (DVT) of right lower extremity (HCC), Depression, Dry ear canal, Early stage skin cancer, Environmental allergies, Erectile dysfunction, Factor V deficiency (HCC), Hx of factor V Leiden mutation, Hypercholesteremia, Hypothyroidism, Irritation of left eye, Mild intermittent asthma without complication, Perennial allergic rhinitis, Positive FIT (fecal immunochemical test), Skin cancer, SOB (shortness of breath), Venous stasis    Pulmonary:      Effort: Pulmonary effort is normal. No respiratory distress.      Breath sounds: Normal breath sounds. No wheezing.      Comments: I do not hear wheezing despite patient's complaint of shortness of breath  Abdominal:      General: Abdomen is flat. Bowel sounds are normal. There is no distension.      Tenderness: There is no abdominal tenderness. There is no guarding or rebound.   Musculoskeletal:         General: No signs of injury.      Cervical back: Neck supple. No tenderness.      Right lower leg: No edema.      Left lower leg: No edema.      Comments: There is a palpable superficial venous thrombus to the medial aspect of his posterior popliteal area on his left leg.  There is negative Homans' sign bilaterally.  2+ distal pulses on the anterior DPs   Skin:     General: Skin is warm and dry.      Capillary Refill: Capillary refill takes less than 2 seconds.   Neurological:      General: No focal deficit present.      Mental Status: He is alert and oriented to person, place, and time.      Sensory: No sensory deficit.      Motor: No weakness.      Coordination: Coordination normal.   Psychiatric:         Mood and Affect: Mood normal.         Behavior: Behavior is cooperative.           DDX/DIAGNOSTIC RESULTS / EMERGENCY DEPARTMENT COURSE / MDM     Medical Decision Making  Amount and/or Complexity of Data Reviewed  Labs: ordered. Decision-making details documented in ED Course.  Radiology: ordered.  ECG/medicine tests: ordered.    Risk  Prescription drug management.              EKG      All EKG's are interpreted by the Emergency Department Physician who either signs or Co-signs this chart in the absence of a cardiologist.    Vascular duplex lower extremity venous bilateral    (Results Pending)   CT CHEST PULMONARY EMBOLISM W CONTRAST    (Results Pending)       EMERGENCY DEPARTMENT COURSE:    ED Course as of 03/28/24 1555   Thu Mar 28, 2024   1434 WBC: 5.1 [LL]   1434 Hemoglobin Quant: 14.0 [LL]

## 2024-03-28 NOTE — CONSULTS
03/28/2024 02:09 PM    RDW 13.6 03/28/2024 02:09 PM    PLT See Reflexed IPF Result 03/28/2024 02:09 PM    MPV 9.9 04/02/2018 10:26 AM     BMP:    Lab Results   Component Value Date/Time     03/28/2024 02:09 PM    K 4.2 03/28/2024 02:09 PM     03/28/2024 02:09 PM    CO2 22 03/28/2024 02:09 PM    BUN 25 03/28/2024 02:09 PM    LABALBU 4.3 03/28/2024 02:09 PM    CREATININE 1.2 03/28/2024 02:09 PM    CALCIUM 9.4 03/28/2024 02:09 PM    GFRAA >60 02/01/2021 10:35 AM    LABGLOM 63 03/28/2024 02:09 PM    GLUCOSE 99 03/28/2024 02:09 PM      D-Dimer: 1.9 (0-0.57)    Assessment and Plan:     73-year-old male with history of factor V Leiden presents with sub-segmental pulmonary embolism and thrombosed varicose veins.     - No acute surgical intervention at this time  - Recommend Eliquis for 3 months for PE  - No DVT seen on ultrasound of bilateral lower extremities   - follow up with hematology outpatient to discuss anticoagulation for Factor V Leiden with history of DVT and small PE   - Recommend compression socks for varicose veins    Electronically signed by Kt Johnson DO on 3/28/24 at 6:05 PM TOMASA Reese MD  PGY-4 General Surgery  6:43 PM 03/28/24

## 2024-03-28 NOTE — DISCHARGE INSTRUCTIONS
You were seen today in the emergency department for your shortness of breath.  We have evaluated you and determined that you have a pulmonary embolism.  We now feel you are safe for discharge home.  You are being started on Eliquis.  You have 2 prescriptions for Eliquis.  1 is Eliquis starter pack which she will take 2 tablets twice a day for 7 days followed by 1 tablet twice a day for the rest of the 30 days.  After that you may take 1 tablet twice a day.  Please follow-up with the hematologist soon as possible for further recommendations.    Please return to the emergency department immediately if develop any new or worsening concerns including chest pain, shortness of breath, abdominal pain, nausea, vomiting, diarrhea, weakness, loss consciousness, fever, chills, or any other concerns.    Please call your PCP and schedule appointment within the next 24 to 48 hours for follow-up.

## 2024-03-28 NOTE — ED PROVIDER NOTES
lower extremity venous bilateral    (Results Pending)       RECENT VITALS:     Temp: 96.8 °F (36 °C),  Pulse: 65, Respirations: 16, BP: (!) 151/79, SpO2: 97 %    This patient is a 73 y.o. Male with concern for blood clot in the left lower leg.  Patient has history of factor V Leiden.  Patient is not on anticoagulation.  Patient does have history of blood clots.  Point-of-care bedside vascular ultrasound did not show any obvious DVT.  Official Doppler study ordered in addition to CT pulmonary embolism.  Patient has no chest pain, but is complaining of shortness of breath.  Patient has received steroids, antibiotics and nebulizers for home for presumed bronchitis however has not been improving.  Patient not hypoxic, not tachycardic.    ED Course as of 03/28/24 1836   Thu Mar 28, 2024   1434 WBC: 5.1 [LL]   1434 Hemoglobin Quant: 14.0 [LL]   1502 Troponin, High Sensitivity: 12 [LL]   1502 Sodium: 138 [LL]   1502 Potassium: 4.2 [LL]   1502 Chloride: 104 [LL]   1502 CO2: 22 [LL]   1502 Anion Gap: 12 [LL]   1502 Glucose, Random: 99 [LL]   1502 BUN,BUNPL(!): 25 [LL]   1502 Est, Glom Filt Rate: 63 [LL]   1502 Creatinine: 1.2 [LL]   1700 Vascular surgery consulted for very small, nonocclusive pulmonary embolism [AK]   1836 Vascular surgery recommending Eliquis starter pack, follow-up with hematology.  Will discharge with prescriptions. [AK]      ED Course User Index  [AK] Shabbir Cisneros MD  [LL] Jannet Parra MD         OUTSTANDING TASKS / RECOMMENDATIONS:      Follow-up duplex ultrasound, CT pulmonary embolism  Dispo     FINAL IMPRESSION:     1. Left leg pain    2. Shortness of breath    3. Single subsegmental pulmonary embolism without acute cor pulmonale (HCC)        DISPOSITION:       DISPOSITION:  [x]  Discharge   []  Transfer -    []  Admission -     []  Against Medical Advice   []  Eloped   FOLLOW-UP: Kin Hess MD  [address]  [place]  [place] [address]  [phone]    Schedule an appointment as

## 2024-03-29 ENCOUNTER — TELEPHONE (OUTPATIENT)
Dept: PULMONOLOGY | Age: 74
End: 2024-03-29

## 2024-03-29 ENCOUNTER — CLINICAL DOCUMENTATION (OUTPATIENT)
Dept: PHYSICAL THERAPY | Facility: CLINIC | Age: 74
End: 2024-03-29

## 2024-03-29 LAB
EKG ATRIAL RATE: 55 BPM
EKG P AXIS: -3 DEGREES
EKG P-R INTERVAL: 228 MS
EKG Q-T INTERVAL: 416 MS
EKG QRS DURATION: 86 MS
EKG QTC CALCULATION (BAZETT): 397 MS
EKG R AXIS: -11 DEGREES
EKG T AXIS: 62 DEGREES
EKG VENTRICULAR RATE: 55 BPM

## 2024-03-29 PROCEDURE — 93010 ELECTROCARDIOGRAM REPORT: CPT | Performed by: INTERNAL MEDICINE

## 2024-03-29 PROCEDURE — 93970 EXTREMITY STUDY: CPT | Performed by: SURGERY

## 2024-03-29 NOTE — TELEPHONE ENCOUNTER
Spoke with Dr. Villarreal,  ED yesterday, as pt presented with calf pain after run. Venous doppler neg bilat but solitary small vascular filling defect WESLY in sub seg vessel.  Visualized on two views. Pt discharged home on Eliquis 5mg bid.  Pt has scheduled appt with hematology.  Left voice mail for patient.

## 2024-03-29 NOTE — FLOWSHEET NOTE
fine.  He wishes to have a 10-15 min ex program.      Objective:               ROM  ° A/P STRENGTH TESTS (+/-) Left Right Not Tested     Left Right Left Right Ant. Drawer     []    Hip Flex         Post. Drawer     []    Ext stiff       Lachmans     []    ER         Valgus Stress     []    IR         Varus Stress     []    ABD         Codey’s     []    ADD         Pat-Fem Grind     []    Knee Flex         FADIRs     []    Ext         Hip Scouring     []    Ankle DF stiff       CHELSEA’s     []    PF         Piriformis     []    INV         Felecia’s     []    EVER         David       []    GTE stiff       Jameson's     []          OBSERVATION No Deficit Deficit Not Tested Comments   Posture           Forward Head []  []  []      Rounded Shoulders []  []  []      Kyphosis []  []  []      Lordosis []  []  []      Scoliosis []  []  []      Iliac Crest []  []  []      PSIS []  []  []      ASIS []  []  []      Genu Valgus []  []  []      Genu Varus []  []  []      Genu Recurvatum []  []  []      Pronation []  []  []      Supination []  []  []      Leg Length Discrp []  []  []      Slumped Sitting []  []  []      Palpation []  [x]  []  Garth calves, L buttock, L PF   Sensation []  []  []      Edema []  []  []      Neurological []  []  []      Patellar Mobility []  []  []      Patellar Orientation []  []  []      Gait []  []  []  Analysis:deferred               Functional Test: UWRI  Score: 17 % functionally impaired        Assessment:  STG: (to be met in 5 treatments)  UWRI score to <10% interference  Independent with Home Exercise Programs     LTG: (to be met in 10 treatments)  No pain in L lateral knee and ankle  Minimal tenderness with on calf and buttock                    Patient goals: to be instructed on HEP    Treatment to Date:  [x] Therapeutic Exercise    [] Modalities:  [x] Therapeutic Activity    [] Ultrasound  [] Electrical Stimulation  [] Gait Training     [] Massage       [] Lumbar/Cervical Traction  []

## 2024-03-31 PROBLEM — I26.93 SINGLE SUBSEGMENTAL PULMONARY EMBOLISM WITHOUT ACUTE COR PULMONALE (HCC): Status: ACTIVE | Noted: 2024-03-31

## 2024-03-31 PROBLEM — M79.605 LEFT LEG PAIN: Status: ACTIVE | Noted: 2024-03-31

## 2024-04-01 ENCOUNTER — OFFICE VISIT (OUTPATIENT)
Dept: PULMONOLOGY | Age: 74
End: 2024-04-01
Payer: MEDICARE

## 2024-04-01 VITALS
SYSTOLIC BLOOD PRESSURE: 116 MMHG | HEART RATE: 65 BPM | DIASTOLIC BLOOD PRESSURE: 70 MMHG | HEIGHT: 76 IN | OXYGEN SATURATION: 96 % | RESPIRATION RATE: 16 BRPM | BODY MASS INDEX: 23.75 KG/M2 | WEIGHT: 195 LBS

## 2024-04-01 DIAGNOSIS — R09.82 POST-NASAL DRIP: ICD-10-CM

## 2024-04-01 DIAGNOSIS — I82.90 VTE (VENOUS THROMBOEMBOLISM): Primary | ICD-10-CM

## 2024-04-01 DIAGNOSIS — Z86.2 HX OF FACTOR V LEIDEN MUTATION: ICD-10-CM

## 2024-04-01 DIAGNOSIS — J30.89 PERENNIAL ALLERGIC RHINITIS: ICD-10-CM

## 2024-04-01 DIAGNOSIS — J45.30 MILD PERSISTENT ASTHMA WITHOUT COMPLICATION: ICD-10-CM

## 2024-04-01 DIAGNOSIS — J45.990 EXERCISE-INDUCED ASTHMA: ICD-10-CM

## 2024-04-01 PROCEDURE — 99214 OFFICE O/P EST MOD 30 MIN: CPT | Performed by: INTERNAL MEDICINE

## 2024-04-01 PROCEDURE — 1036F TOBACCO NON-USER: CPT | Performed by: INTERNAL MEDICINE

## 2024-04-01 PROCEDURE — G8420 CALC BMI NORM PARAMETERS: HCPCS | Performed by: INTERNAL MEDICINE

## 2024-04-01 PROCEDURE — 1123F ACP DISCUSS/DSCN MKR DOCD: CPT | Performed by: INTERNAL MEDICINE

## 2024-04-01 PROCEDURE — 3017F COLORECTAL CA SCREEN DOC REV: CPT | Performed by: INTERNAL MEDICINE

## 2024-04-01 PROCEDURE — G8427 DOCREV CUR MEDS BY ELIG CLIN: HCPCS | Performed by: INTERNAL MEDICINE

## 2024-04-01 RX ORDER — FLUTICASONE FUROATE, UMECLIDINIUM BROMIDE AND VILANTEROL TRIFENATATE 200; 62.5; 25 UG/1; UG/1; UG/1
1 POWDER RESPIRATORY (INHALATION) DAILY
Qty: 2 EACH | Refills: 0 | Status: SHIPPED | COMMUNITY
Start: 2024-04-01

## 2024-04-01 ASSESSMENT — ENCOUNTER SYMPTOMS
EYES NEGATIVE: 1
COUGH: 1

## 2024-04-01 NOTE — PROGRESS NOTES
encounter.    Return in about 6 months (around 10/1/2024).       Electronically signed by Kin Maruqez DO on 4/1/2024at 5:14 PM

## 2024-04-26 ENCOUNTER — TELEPHONE (OUTPATIENT)
Dept: ONCOLOGY | Age: 74
End: 2024-04-26

## 2024-04-26 ENCOUNTER — INITIAL CONSULT (OUTPATIENT)
Dept: ONCOLOGY | Age: 74
End: 2024-04-26
Payer: MEDICARE

## 2024-04-26 VITALS
RESPIRATION RATE: 16 BRPM | HEART RATE: 50 BPM | HEIGHT: 75 IN | WEIGHT: 200.3 LBS | TEMPERATURE: 97.5 F | SYSTOLIC BLOOD PRESSURE: 132 MMHG | BODY MASS INDEX: 24.9 KG/M2 | DIASTOLIC BLOOD PRESSURE: 76 MMHG

## 2024-04-26 DIAGNOSIS — Z86.2 HX OF FACTOR V LEIDEN MUTATION: Primary | ICD-10-CM

## 2024-04-26 DIAGNOSIS — I26.93 SINGLE SUBSEGMENTAL PULMONARY EMBOLISM WITHOUT ACUTE COR PULMONALE (HCC): ICD-10-CM

## 2024-04-26 DIAGNOSIS — I80.02 SUPERFICIAL THROMBOPHLEBITIS OF LEFT LEG: ICD-10-CM

## 2024-04-26 PROCEDURE — 99202 OFFICE O/P NEW SF 15 MIN: CPT

## 2024-04-26 NOTE — TELEPHONE ENCOUNTER
LUIS HERE FOR CONSULT   Need Duppler study and CT scan and DDIMER in 2 months or so, rv after all   CT & VAS ON: 6/10/24 @ 10:30AM ARRIVAL @ 10:15AM (TESTS BACK TO BACK) (PT ALSO REQUESTED THIS SPECIFIC DATE DUE TO IT WORKING BEST FOR HIS SCHEDULE)   MD VISIT: 7/8/24 @ 9:15AM (PT GOING ON VACATION & NEEDED THIS SPECIFIC DATE)   LAB PRINTED AND WILL BE DONE ON TESTING DAY   AVS PRINTED AND GIVEN ON EXIT

## 2024-04-26 NOTE — PROGRESS NOTES
for anticoagulation based on that.  It is unlikely that I will offer the patient long-term anticoagulation based on his presentation.  Patient verbalized understanding and agreed  Patient does not have other risk factors including smoking, obesity, bedridden status or recurrent travels  Andres Dixon MD  Hematologist/Medical Oncologist  [facility]        I spent a total of 40 minutes on the date of the service which included preparing to see the patient, face-to-face patient care, completing clinical documentation, obtaining and/or reviewing separately obtained history, performing a medically appropriate examination, counseling and educating the patient/family/caregiver, ordering medications, tests, or procedures, communicating with other HCPs (not separately reported), independently interpreting results (not separately reported), communicating results to the patient/family/caregiver and care coordination (not separately reported).

## 2024-05-31 ENCOUNTER — OFFICE VISIT (OUTPATIENT)
Dept: PULMONOLOGY | Age: 74
End: 2024-05-31

## 2024-05-31 VITALS
WEIGHT: 192 LBS | TEMPERATURE: 99.2 F | BODY MASS INDEX: 23.38 KG/M2 | RESPIRATION RATE: 13 BRPM | HEIGHT: 76 IN | SYSTOLIC BLOOD PRESSURE: 125 MMHG | DIASTOLIC BLOOD PRESSURE: 72 MMHG | HEART RATE: 58 BPM | OXYGEN SATURATION: 97 %

## 2024-05-31 DIAGNOSIS — Z86.2 HX OF FACTOR V LEIDEN MUTATION: ICD-10-CM

## 2024-05-31 DIAGNOSIS — I82.90 VTE (VENOUS THROMBOEMBOLISM): Primary | ICD-10-CM

## 2024-05-31 DIAGNOSIS — J45.990 EXERCISE-INDUCED ASTHMA: ICD-10-CM

## 2024-05-31 DIAGNOSIS — J45.30 MILD PERSISTENT ASTHMA WITHOUT COMPLICATION: ICD-10-CM

## 2024-05-31 ASSESSMENT — ENCOUNTER SYMPTOMS
EYES NEGATIVE: 1
RESPIRATORY NEGATIVE: 1

## 2024-05-31 NOTE — PATIENT INSTRUCTIONS
Neyda will  sample of Trelegy 200mcg and it will be in [place] on Friday 6/7/24 by 9:15am for you to .

## 2024-06-01 NOTE — PROGRESS NOTES
without complication    3. Exercise-induced asthma    4. Hx of factor V Leiden mutation      Patient Active Problem List   Diagnosis    Mild intermittent asthma without complication    Venous stasis ulcer of ankle (HCC)    Dry ear canal    Irritation of left eye    Hx of factor V Leiden mutation    Hypercholesteremia    Abnormal EKG    Acute pain of right knee    Acute conjunctivitis of both eyes    Positive FIT (fecal immunochemical test)    Acquired hypothyroidism    Perennial allergic rhinitis    Erectile dysfunction    Benign prostatic hyperplasia    Left leg pain    Single subsegmental pulmonary embolism without acute cor pulmonale (HCC)         Plan:      Complete 3 months of anticoagulation.  From my perspective, feel comfortable discontinuing anticoagulation.  The diagnosis of second episode of venous thromboembolism in this instance is not firm.  Informed decision making with patient.  Follow-up CT scan of the chest ordered by Dr. Patel.  Continue as needed albuterol and Trelegy for asthma.  Return in 1 year.  No orders of the defined types were placed in this encounter.    No orders of the defined types were placed in this encounter.    Return in about 1 year (around 5/31/2025).       Electronically signed by Kin Marquez DO on 5/31/2024at 8:44 PM

## 2024-06-06 ENCOUNTER — TELEPHONE (OUTPATIENT)
Dept: PULMONOLOGY | Age: 74
End: 2024-06-06

## 2024-06-06 DIAGNOSIS — I26.93 SINGLE SUBSEGMENTAL PULMONARY EMBOLISM WITHOUT ACUTE COR PULMONALE (HCC): Primary | ICD-10-CM

## 2024-06-06 RX ORDER — FLUTICASONE FUROATE, UMECLIDINIUM BROMIDE AND VILANTEROL TRIFENATATE 200; 62.5; 25 UG/1; UG/1; UG/1
1 POWDER RESPIRATORY (INHALATION) DAILY
Qty: 1 EACH | Refills: 0 | Status: SHIPPED | COMMUNITY
Start: 2024-06-06

## 2024-06-06 NOTE — TELEPHONE ENCOUNTER
Patient will be picking up a sample of Trelegy at the [place] office on 6/7/24.  Please sign if you agree. Thank you.

## 2024-06-07 NOTE — TELEPHONE ENCOUNTER
Writer called and spoke with pt. Writer apologized that sample was not at the [place] office when he came to pick it up. Pt was very understanding and he stated he will pick it up on Monday. Medication sample is in the drawer at checkout at the [place] office. [place] staff are aware pt will  Monday morning.

## 2024-06-07 NOTE — TELEPHONE ENCOUNTER
Hilton came to the [place] office at 9:15 AM to  a sample of Trelegy. He stated that Neyda advised it would be ready for . Writer was unable to locate the Trelegy sample and asked if he could return this afternoon. Patient stated he could not but that he would pick it up on Monday 6/10/24. Writer told him I will call him and leave a voicemail message confirming that the sample is here. Patient expressed understanding.

## 2024-06-10 ENCOUNTER — HOSPITAL ENCOUNTER (OUTPATIENT)
Dept: CT IMAGING | Age: 74
Discharge: HOME | End: 2024-06-12
Attending: INTERNAL MEDICINE
Payer: MEDICARE

## 2024-06-10 ENCOUNTER — HOSPITAL ENCOUNTER (OUTPATIENT)
Dept: VASCULAR LAB | Age: 74
Discharge: HOME | End: 2024-06-12
Attending: INTERNAL MEDICINE
Payer: MEDICARE

## 2024-06-10 DIAGNOSIS — Z86.2 HX OF FACTOR V LEIDEN MUTATION: ICD-10-CM

## 2024-06-10 DIAGNOSIS — I80.02 SUPERFICIAL THROMBOPHLEBITIS OF LEFT LEG: ICD-10-CM

## 2024-06-10 DIAGNOSIS — I26.93 SINGLE SUBSEGMENTAL PULMONARY EMBOLISM WITHOUT ACUTE COR PULMONALE (HCC): ICD-10-CM

## 2024-06-10 LAB
CREAT SERPL-MCNC: 1.1 MG/DL (ref 0.7–1.2)
GFR, ESTIMATED: 70 ML/MIN/1.73M2

## 2024-06-10 PROCEDURE — 2580000003 HC RX 258: Performed by: INTERNAL MEDICINE

## 2024-06-10 PROCEDURE — 82565 ASSAY OF CREATININE: CPT

## 2024-06-10 PROCEDURE — 36415 COLL VENOUS BLD VENIPUNCTURE: CPT

## 2024-06-10 PROCEDURE — 71260 CT THORAX DX C+: CPT

## 2024-06-10 PROCEDURE — 93971 EXTREMITY STUDY: CPT

## 2024-06-10 PROCEDURE — 6360000004 HC RX CONTRAST MEDICATION: Performed by: INTERNAL MEDICINE

## 2024-06-10 RX ORDER — 0.9 % SODIUM CHLORIDE 0.9 %
80 INTRAVENOUS SOLUTION INTRAVENOUS ONCE
Status: COMPLETED | OUTPATIENT
Start: 2024-06-10 | End: 2024-06-10

## 2024-06-10 RX ORDER — SODIUM CHLORIDE 0.9 % (FLUSH) 0.9 %
10 SYRINGE (ML) INJECTION PRN
Status: DISCONTINUED | OUTPATIENT
Start: 2024-06-10 | End: 2024-06-13 | Stop reason: HOSPADM

## 2024-06-10 RX ADMIN — IOPAMIDOL 75 ML: 755 INJECTION, SOLUTION INTRAVENOUS at 12:11

## 2024-06-10 RX ADMIN — SODIUM CHLORIDE, PRESERVATIVE FREE 10 ML: 5 INJECTION INTRAVENOUS at 12:11

## 2024-06-10 RX ADMIN — SODIUM CHLORIDE 80 ML: 0.9 INJECTION, SOLUTION INTRAVENOUS at 12:10

## 2024-07-05 ENCOUNTER — HOSPITAL ENCOUNTER (OUTPATIENT)
Age: 74
Discharge: HOME | End: 2024-07-05
Payer: MEDICARE

## 2024-07-05 DIAGNOSIS — I26.93 SINGLE SUBSEGMENTAL PULMONARY EMBOLISM WITHOUT ACUTE COR PULMONALE (HCC): ICD-10-CM

## 2024-07-05 DIAGNOSIS — I80.02 SUPERFICIAL THROMBOPHLEBITIS OF LEFT LEG: ICD-10-CM

## 2024-07-05 DIAGNOSIS — Z86.2 HX OF FACTOR V LEIDEN MUTATION: ICD-10-CM

## 2024-07-05 LAB — D DIMER PPP FEU-MCNC: 0.42 UG/ML FEU (ref 0–0.59)

## 2024-07-05 PROCEDURE — 36415 COLL VENOUS BLD VENIPUNCTURE: CPT

## 2024-07-05 PROCEDURE — 85379 FIBRIN DEGRADATION QUANT: CPT

## 2024-07-08 ENCOUNTER — TELEPHONE (OUTPATIENT)
Dept: ONCOLOGY | Age: 74
End: 2024-07-08

## 2024-07-08 ENCOUNTER — OFFICE VISIT (OUTPATIENT)
Dept: ONCOLOGY | Age: 74
End: 2024-07-08
Payer: MEDICARE

## 2024-07-08 VITALS
TEMPERATURE: 97.4 F | RESPIRATION RATE: 16 BRPM | BODY MASS INDEX: 23.93 KG/M2 | HEART RATE: 50 BPM | SYSTOLIC BLOOD PRESSURE: 136 MMHG | WEIGHT: 196.6 LBS | DIASTOLIC BLOOD PRESSURE: 75 MMHG

## 2024-07-08 DIAGNOSIS — I80.02 SUPERFICIAL THROMBOPHLEBITIS OF LEFT LEG: ICD-10-CM

## 2024-07-08 DIAGNOSIS — Z86.2 HX OF FACTOR V LEIDEN MUTATION: Primary | ICD-10-CM

## 2024-07-08 PROCEDURE — 1036F TOBACCO NON-USER: CPT | Performed by: INTERNAL MEDICINE

## 2024-07-08 PROCEDURE — 99215 OFFICE O/P EST HI 40 MIN: CPT | Performed by: INTERNAL MEDICINE

## 2024-07-08 PROCEDURE — 1123F ACP DISCUSS/DSCN MKR DOCD: CPT | Performed by: INTERNAL MEDICINE

## 2024-07-08 PROCEDURE — 99211 OFF/OP EST MAY X REQ PHY/QHP: CPT

## 2024-07-08 PROCEDURE — G8427 DOCREV CUR MEDS BY ELIG CLIN: HCPCS | Performed by: INTERNAL MEDICINE

## 2024-07-08 PROCEDURE — 3017F COLORECTAL CA SCREEN DOC REV: CPT | Performed by: INTERNAL MEDICINE

## 2024-07-08 PROCEDURE — G8420 CALC BMI NORM PARAMETERS: HCPCS | Performed by: INTERNAL MEDICINE

## 2024-07-08 NOTE — PROGRESS NOTES
DIAGNOSIS:   History of right lower extremity deep venous thrombosis and pulmonary embolism, occurring after leg fracture, 1997  Factor V Leiden mutation  Superficial thrombosis of the left lower extremity with possible pulmonary embolism in 4/ 2024, unprovoked  CURRENT THERAPY:  Anticoagulation with Coumadin in 1997, likely for 6 to 10 months  Started on Eliquis 4/2024, stopped after 3 months.   Refused long term anticoagulation   BRIEF CASE HISTORY:   Hilton Randle is a very pleasant 74 y.o. male who is referred to us for anticoagulation recommendations.  He has a previous history of right lower extremity deep venous thrombosis and acute pulmonary embolism in 1997 happening after right leg fracture.  He received 6 months of anticoagulation.  Later on he had a hypercoagulable workup done a couple of years later by his primary care physician and that showed factor V Leiden mutation.  The patient was observed off anticoagulation.  In 2024, he developed bronchitis and flu and he was very sick for a week but he never needed hospitalization.  He came to emergency room complaining of pain in the left lower extremity related to an old varicose vein.  Evaluation suggested superficial thrombophlebitis and that was confirmed via Doppler study.  No evidence of DVT was done.  However, when he was in emergency room, he underwent a CT scan of the chest that suggested a small subsegmental pulmonary embolism in the left lung.  I reviewed the imaging and there is definitely a concerning area although it was not definitive and the area is very small.  He is sent to us for a consultation, doing well on Eliquis without any symptoms  INTERIM HISTORY  Patient comes in today for a follow-up.  The pain and swelling in the lower extremity improved significantly.  Continues to have significant superficial varicosities on both lower extremities but the pain of the superficial thrombus is completely gone.  PAST MEDICAL HISTORY: has a past

## 2024-09-09 ENCOUNTER — TELEPHONE (OUTPATIENT)
Dept: PULMONOLOGY | Age: 74
End: 2024-09-09

## 2024-09-09 RX ORDER — MONTELUKAST SODIUM 10 MG/1
10 TABLET ORAL NIGHTLY
Qty: 30 TABLET | Refills: 5 | Status: SHIPPED | OUTPATIENT
Start: 2024-09-09

## 2024-10-08 DIAGNOSIS — J45.21 MILD INTERMITTENT ASTHMA WITH (ACUTE) EXACERBATION: ICD-10-CM

## 2024-10-08 DIAGNOSIS — J45.30 MILD PERSISTENT ASTHMA WITHOUT COMPLICATION: ICD-10-CM

## 2024-10-08 RX ORDER — ALBUTEROL SULFATE 0.83 MG/ML
2.5 SOLUTION RESPIRATORY (INHALATION) 4 TIMES DAILY PRN
Qty: 120 EACH | Refills: 11 | Status: SHIPPED | OUTPATIENT
Start: 2024-10-08

## 2024-10-08 RX ORDER — ALBUTEROL SULFATE 90 UG/1
2 INHALANT RESPIRATORY (INHALATION) EVERY 6 HOURS PRN
Qty: 1 EACH | Refills: 11 | Status: SHIPPED | OUTPATIENT
Start: 2024-10-08

## 2024-10-08 NOTE — TELEPHONE ENCOUNTER
Patient called and stated that he switched to a new pharmacy and needs refills on albuterol nebulizer and inhaler.  Patient was last seen 5/31/24 and has a follow up 5/23/25.  Please sign pended scripts if you agree

## 2024-10-09 ENCOUNTER — TELEPHONE (OUTPATIENT)
Dept: PULMONOLOGY | Age: 74
End: 2024-10-09

## 2024-10-09 NOTE — TELEPHONE ENCOUNTER
DARELL Reed spoke with Amar[facility] she informed me that they will have to get patient's Medicare card from him and that they have the script ready with a discount card for him.

## 2024-10-09 NOTE — TELEPHONE ENCOUNTER
Denied  PA Detail   Note from payer: Denied. ALBUTEROL SULFATE Nebu Soln is used in a nebulizer. A nebulizer is a piece of durable medical equipment (DME). Drugs used with DME in the home are covered under Medicare Part B. Our records show that you do not live in a long term care (LTC) facility. We cannot pay for drugs under Medicare Part D if they are covered under Medicare Part A or B. We did not decide whether ALBUTEROL SULFATE Nebu Soln is medically necessary. We made our decision only on the fact that we cannot pay for the drug under Medicare Part D. For more information, talk to your prescriber or call [phone].  Payer: [employer] Patient's Payer Case ID: [id number]    [phone]  Electronic appeal: Not supported  View History

## 2025-03-10 RX ORDER — MONTELUKAST SODIUM 10 MG/1
10 TABLET ORAL NIGHTLY
Qty: 90 TABLET | Refills: 0 | Status: SHIPPED | OUTPATIENT
Start: 2025-03-10

## 2025-03-10 NOTE — TELEPHONE ENCOUNTER
LAST VISIT: 5/31/24 with Dr Marquez  NEXT VISIT: 5/23/25 with Dr Ford     Please sign for refill if ok. Thank you.

## 2025-03-11 RX ORDER — MONTELUKAST SODIUM 10 MG/1
10 TABLET ORAL NIGHTLY
Qty: 30 TABLET | Refills: 5 | Status: SHIPPED | OUTPATIENT
Start: 2025-03-11

## 2025-05-23 ENCOUNTER — OFFICE VISIT (OUTPATIENT)
Dept: PULMONOLOGY | Age: 75
End: 2025-05-23

## 2025-05-23 VITALS
HEART RATE: 55 BPM | SYSTOLIC BLOOD PRESSURE: 135 MMHG | DIASTOLIC BLOOD PRESSURE: 83 MMHG | HEIGHT: 76 IN | WEIGHT: 199 LBS | RESPIRATION RATE: 14 BRPM | BODY MASS INDEX: 24.23 KG/M2 | OXYGEN SATURATION: 96 %

## 2025-05-23 DIAGNOSIS — I82.90 VTE (VENOUS THROMBOEMBOLISM): Primary | ICD-10-CM

## 2025-05-23 DIAGNOSIS — J45.20 MILD INTERMITTENT ASTHMA WITHOUT COMPLICATION: ICD-10-CM

## 2025-05-23 DIAGNOSIS — Z86.2 HX OF FACTOR V LEIDEN MUTATION: ICD-10-CM

## 2025-05-23 RX ORDER — ALBUTEROL SULFATE 90 UG/1
2 INHALANT RESPIRATORY (INHALATION) EVERY 6 HOURS PRN
Qty: 1 EACH | Refills: 11 | Status: SHIPPED | OUTPATIENT
Start: 2025-05-23

## 2025-05-23 NOTE — PROGRESS NOTES
contact the office for a potential prescription of prednisone and antibiotics to prevent complications.    2.  History of DVTs  He has a history of a blood clot in his leg and a small clot in his lung. He is not currently on Eliquis and has decided against continuing anticoagulation therapy. The risks and benefits of discontinuing anticoagulation were discussed, and he understands the potential risks of recurrent thromboembolism. He is advised to monitor for any signs of new clots and seek immediate medical attention if symptoms arise.    3. Allergies.  He takes Zyrtec daily for allergies and finds it effective. He is advised to continue Zyrtec as needed for allergy control.  Also to continue Singulair 10 mg tablet daily.    4. Hypothyroidism.  He is currently taking Synthroid for low thyroid function. No changes to his medication regimen are necessary at this time.    Follow-up  The patient will follow up in 1 year.               ZEINAB KWAN MD  Pulmonary critical care attending physician  [facility]  Office phone number [phone]  5/23/2025, 9:04 AM      This note is created with the assistance of a speech recognition program.  While intent was to generate a document that actually reflects the content of the visit, the document can still have some errors including those of syntax and sound-alike substitutions which may escape proof reading.  It such instances, actual meaning can be extrapolated by contextual diversion.   The patient (or guardian, if applicable) and other individuals in attendance with the patient were advised that Artificial Intelligence will be utilized during this visit to record, process the conversation to generate a clinical note, and support improvement of the AI technology. The patient (or guardian, if applicable) and other individuals in attendance at the appointment consented to the use of AI, including the recording.

## 2025-06-02 ENCOUNTER — TRANSCRIBE ORDERS (OUTPATIENT)
Dept: ADMINISTRATIVE | Age: 75
End: 2025-06-02

## 2025-06-02 DIAGNOSIS — N40.0 BENIGN PROSTATIC HYPERPLASIA, UNSPECIFIED WHETHER LOWER URINARY TRACT SYMPTOMS PRESENT: Primary | ICD-10-CM

## 2025-06-30 ENCOUNTER — HOSPITAL ENCOUNTER (OUTPATIENT)
Dept: MRI IMAGING | Age: 75
Discharge: HOME | End: 2025-07-02
Attending: UROLOGY
Payer: MEDICARE

## 2025-06-30 DIAGNOSIS — N40.0 BENIGN PROSTATIC HYPERPLASIA, UNSPECIFIED WHETHER LOWER URINARY TRACT SYMPTOMS PRESENT: ICD-10-CM

## 2025-06-30 LAB
CREAT SERPL-MCNC: 1.2 MG/DL (ref 0.7–1.2)
GFR, ESTIMATED: 63 ML/MIN/1.73M2

## 2025-06-30 PROCEDURE — 82565 ASSAY OF CREATININE: CPT

## 2025-06-30 PROCEDURE — 72197 MRI PELVIS W/O & W/DYE: CPT

## 2025-06-30 PROCEDURE — A9579 GAD-BASE MR CONTRAST NOS,1ML: HCPCS | Performed by: UROLOGY

## 2025-06-30 PROCEDURE — 36415 COLL VENOUS BLD VENIPUNCTURE: CPT

## 2025-06-30 PROCEDURE — 6360000004 HC RX CONTRAST MEDICATION: Performed by: UROLOGY

## 2025-06-30 RX ORDER — GADOTERIDOL 279.3 MG/ML
20 INJECTION INTRAVENOUS
Status: COMPLETED | OUTPATIENT
Start: 2025-06-30 | End: 2025-06-30

## 2025-06-30 RX ADMIN — GADOTERIDOL 20 ML: 279.3 INJECTION, SOLUTION INTRAVENOUS at 13:10

## (undated) DEVICE — SNARE ENDOSCP M L240CM LOOP W27MM SHTH DIA2.4MM OVL FLX

## (undated) DEVICE — NEEDLE SCLERO 25GA L240CM OD0.51MM ID0.24MM EXTN L4MM SHTH

## (undated) DEVICE — ELECTRODE PT RET AD L9FT HI MOIST COND ADH HYDRGEL CORDED